# Patient Record
Sex: MALE | Race: WHITE | NOT HISPANIC OR LATINO | Employment: FULL TIME | URBAN - METROPOLITAN AREA
[De-identification: names, ages, dates, MRNs, and addresses within clinical notes are randomized per-mention and may not be internally consistent; named-entity substitution may affect disease eponyms.]

---

## 2021-02-04 ENCOUNTER — HISTORICAL (OUTPATIENT)
Dept: LAB | Facility: HOSPITAL | Age: 61
End: 2021-02-04

## 2021-02-04 LAB
ABS NEUT (OLG): 4.49 X10(3)/MCL (ref 2.1–9.2)
ALBUMIN SERPL-MCNC: 4.2 GM/DL (ref 3.4–4.8)
ALBUMIN/GLOB SERPL: 1.4 RATIO (ref 1.1–2)
ALP SERPL-CCNC: 72 UNIT/L (ref 40–150)
ALT SERPL-CCNC: 28 UNIT/L (ref 0–55)
APPEARANCE, UA: CLEAR
AST SERPL-CCNC: 21 UNIT/L (ref 5–34)
BACTERIA #/AREA URNS AUTO: ABNORMAL /HPF
BASOPHILS # BLD AUTO: 0.1 X10(3)/MCL (ref 0–0.2)
BASOPHILS NFR BLD AUTO: 1 %
BILIRUB SERPL-MCNC: 0.6 MG/DL
BILIRUB UR QL STRIP: NEGATIVE
BILIRUBIN DIRECT+TOT PNL SERPL-MCNC: 0.2 MG/DL (ref 0–0.5)
BILIRUBIN DIRECT+TOT PNL SERPL-MCNC: 0.4 MG/DL (ref 0–0.8)
BUN SERPL-MCNC: 13 MG/DL (ref 8.4–25.7)
CALCIUM SERPL-MCNC: 9 MG/DL (ref 8.8–10)
CHLORIDE SERPL-SCNC: 108 MMOL/L (ref 98–107)
CHOLEST SERPL-MCNC: 141 MG/DL
CHOLEST/HDLC SERPL: 3 {RATIO} (ref 0–5)
CO2 SERPL-SCNC: 24 MMOL/L (ref 23–31)
COLOR UR: YELLOW
CREAT SERPL-MCNC: 0.84 MG/DL (ref 0.73–1.18)
DEPRECATED CALCIDIOL+CALCIFEROL SERPL-MC: 37 NG/ML (ref 30–80)
EOSINOPHIL # BLD AUTO: 0.4 X10(3)/MCL (ref 0–0.9)
EOSINOPHIL NFR BLD AUTO: 4 %
ERYTHROCYTE [DISTWIDTH] IN BLOOD BY AUTOMATED COUNT: 13.2 % (ref 11.5–14.5)
EST. AVERAGE GLUCOSE BLD GHB EST-MCNC: 105.4 MG/DL
GLOBULIN SER-MCNC: 3.1 GM/DL (ref 2.4–3.5)
GLUCOSE (UA): NEGATIVE
GLUCOSE SERPL-MCNC: 96 MG/DL (ref 82–115)
HBA1C MFR BLD: 5.3 %
HCT VFR BLD AUTO: 42.1 % (ref 40–51)
HDLC SERPL-MCNC: 46 MG/DL (ref 35–60)
HGB BLD-MCNC: 13.8 GM/DL (ref 13.5–17.5)
HGB UR QL STRIP: NEGATIVE
HYALINE CASTS #/AREA URNS LPF: ABNORMAL /LPF
IMM GRANULOCYTES # BLD AUTO: 0.02 10*3/UL
IMM GRANULOCYTES NFR BLD AUTO: 0 %
KETONES UR QL STRIP: NEGATIVE
LDLC SERPL CALC-MCNC: 63 MG/DL (ref 50–140)
LEUKOCYTE ESTERASE UR QL STRIP: NEGATIVE
LYMPHOCYTES # BLD AUTO: 2.5 X10(3)/MCL (ref 0.6–4.6)
LYMPHOCYTES NFR BLD AUTO: 32 %
MCH RBC QN AUTO: 28.9 PG (ref 26–34)
MCHC RBC AUTO-ENTMCNC: 32.8 GM/DL (ref 31–37)
MCV RBC AUTO: 88.3 FL (ref 80–100)
MONOCYTES # BLD AUTO: 0.5 X10(3)/MCL (ref 0.1–1.3)
MONOCYTES NFR BLD AUTO: 6 %
NEUTROPHILS # BLD AUTO: 4.49 X10(3)/MCL (ref 2.1–9.2)
NEUTROPHILS NFR BLD AUTO: 56 %
NITRITE UR QL STRIP: NEGATIVE
PH UR STRIP: 6 [PH] (ref 4.5–8)
PLATELET # BLD AUTO: 263 X10(3)/MCL (ref 130–400)
PMV BLD AUTO: 11.3 FL (ref 7.4–10.4)
POTASSIUM SERPL-SCNC: 4.3 MMOL/L (ref 3.5–5.1)
PROT SERPL-MCNC: 7.3 GM/DL (ref 5.8–7.6)
PROT UR QL STRIP: NEGATIVE
PSA SERPL-MCNC: 5.38 NG/ML
RBC # BLD AUTO: 4.77 X10(6)/MCL (ref 4.5–5.9)
RBC #/AREA URNS AUTO: ABNORMAL /HPF
SODIUM SERPL-SCNC: 141 MMOL/L (ref 136–145)
SP GR UR STRIP: 1.02 (ref 1–1.03)
SQUAMOUS #/AREA URNS LPF: ABNORMAL /LPF
TRIGL SERPL-MCNC: 162 MG/DL (ref 34–140)
UROBILINOGEN UR STRIP-ACNC: NORMAL
VIT B12 SERPL-MCNC: 519 PG/ML (ref 213–816)
VLDLC SERPL CALC-MCNC: 32 MG/DL
WBC # SPEC AUTO: 8 X10(3)/MCL (ref 4.5–11)
WBC #/AREA URNS AUTO: ABNORMAL /HPF

## 2021-02-09 ENCOUNTER — HISTORICAL (OUTPATIENT)
Dept: CARDIOLOGY | Facility: HOSPITAL | Age: 61
End: 2021-02-09

## 2021-02-09 LAB — PSA SERPL-MCNC: 4.59 NG/ML

## 2021-02-22 LAB — CRC RECOMMENDATION EXT: NORMAL

## 2021-03-24 ENCOUNTER — HISTORICAL (OUTPATIENT)
Dept: RADIOLOGY | Facility: HOSPITAL | Age: 61
End: 2021-03-24

## 2022-02-03 ENCOUNTER — HISTORICAL (OUTPATIENT)
Dept: FAMILY MEDICINE | Facility: CLINIC | Age: 62
End: 2022-02-03

## 2022-02-03 LAB
ALBUMIN SERPL-MCNC: 4 G/DL (ref 3.4–4.8)
ALBUMIN/GLOB SERPL: 1.1 {RATIO} (ref 1.1–2)
ALP SERPL-CCNC: 67 U/L (ref 40–150)
ALT SERPL-CCNC: 23 U/L (ref 0–55)
AST SERPL-CCNC: 18 U/L (ref 5–34)
BILIRUB SERPL-MCNC: 0.6 MG/DL
BILIRUBIN DIRECT+TOT PNL SERPL-MCNC: 0.2 (ref 0–0.5)
BILIRUBIN DIRECT+TOT PNL SERPL-MCNC: 0.4 (ref 0–0.8)
BUN SERPL-MCNC: 18 MG/DL (ref 8.4–25.7)
CALCIUM SERPL-MCNC: 9.6 MG/DL (ref 8.7–10.5)
CHLORIDE SERPL-SCNC: 110 MMOL/L (ref 98–107)
CHOLEST SERPL-MCNC: 142 MG/DL
CHOLEST/HDLC SERPL: 3 {RATIO} (ref 0–5)
CO2 SERPL-SCNC: 23 MMOL/L (ref 23–31)
CREAT SERPL-MCNC: 0.8 MG/DL (ref 0.73–1.18)
DEPRECATED CALCIDIOL+CALCIFEROL SERPL-MC: 39.1 NG/ML (ref 30–80)
EST. AVERAGE GLUCOSE BLD GHB EST-MCNC: 102.5 MG/DL
GLOBULIN SER-MCNC: 3.7 G/DL (ref 2.4–3.5)
GLUCOSE SERPL-MCNC: 108 MG/DL (ref 82–115)
HBA1C MFR BLD: 5.2 %
HDLC SERPL-MCNC: 49 MG/DL (ref 35–60)
ICTERIC INTERF INDEX SERPL-ACNC: 1
LDLC SERPL CALC-MCNC: 65 MG/DL (ref 50–140)
LIPEMIC INTERF INDEX SERPL-ACNC: 5
POTASSIUM SERPL-SCNC: 4 MMOL/L (ref 3.5–5.1)
PROT SERPL-MCNC: 7.7 G/DL (ref 5.8–7.6)
PSA SERPL-MCNC: 5.82 NG/ML
SODIUM SERPL-SCNC: 141 MMOL/L (ref 136–145)
TRIGL SERPL-MCNC: 138 MG/DL (ref 34–140)
TSH SERPL-ACNC: 1.1 M[IU]/L (ref 0.35–4.94)
URATE UR-MCNC: 44.1 MG/DL
VLDLC SERPL CALC-MCNC: 28 MG/DL

## 2022-04-10 ENCOUNTER — HISTORICAL (OUTPATIENT)
Dept: ADMINISTRATIVE | Facility: HOSPITAL | Age: 62
End: 2022-04-10
Payer: MEDICAID

## 2022-04-28 VITALS
SYSTOLIC BLOOD PRESSURE: 144 MMHG | HEIGHT: 71 IN | DIASTOLIC BLOOD PRESSURE: 90 MMHG | OXYGEN SATURATION: 96 % | WEIGHT: 232.13 LBS | BODY MASS INDEX: 32.5 KG/M2

## 2022-05-05 ENCOUNTER — OFFICE VISIT (OUTPATIENT)
Dept: UROLOGY | Facility: CLINIC | Age: 62
End: 2022-05-05
Payer: MEDICAID

## 2022-05-05 VITALS
SYSTOLIC BLOOD PRESSURE: 134 MMHG | RESPIRATION RATE: 16 BRPM | TEMPERATURE: 98 F | HEIGHT: 71 IN | OXYGEN SATURATION: 98 % | DIASTOLIC BLOOD PRESSURE: 86 MMHG | HEART RATE: 68 BPM | WEIGHT: 232.13 LBS | BODY MASS INDEX: 32.5 KG/M2

## 2022-05-05 DIAGNOSIS — R97.20 ELEVATED PSA: Primary | ICD-10-CM

## 2022-05-05 PROCEDURE — 4010F PR ACE/ARB THEARPY RXD/TAKEN: ICD-10-PCS | Mod: CPTII,,, | Performed by: NURSE PRACTITIONER

## 2022-05-05 PROCEDURE — 3079F DIAST BP 80-89 MM HG: CPT | Mod: CPTII,,, | Performed by: NURSE PRACTITIONER

## 2022-05-05 PROCEDURE — 3008F PR BODY MASS INDEX (BMI) DOCUMENTED: ICD-10-PCS | Mod: CPTII,,, | Performed by: NURSE PRACTITIONER

## 2022-05-05 PROCEDURE — 99214 OFFICE O/P EST MOD 30 MIN: CPT | Mod: S$PBB,,, | Performed by: NURSE PRACTITIONER

## 2022-05-05 PROCEDURE — 3075F SYST BP GE 130 - 139MM HG: CPT | Mod: CPTII,,, | Performed by: NURSE PRACTITIONER

## 2022-05-05 PROCEDURE — 4010F ACE/ARB THERAPY RXD/TAKEN: CPT | Mod: CPTII,,, | Performed by: NURSE PRACTITIONER

## 2022-05-05 PROCEDURE — 3008F BODY MASS INDEX DOCD: CPT | Mod: CPTII,,, | Performed by: NURSE PRACTITIONER

## 2022-05-05 PROCEDURE — 1160F PR REVIEW ALL MEDS BY PRESCRIBER/CLIN PHARMACIST DOCUMENTED: ICD-10-PCS | Mod: CPTII,,, | Performed by: NURSE PRACTITIONER

## 2022-05-05 PROCEDURE — 1160F RVW MEDS BY RX/DR IN RCRD: CPT | Mod: CPTII,,, | Performed by: NURSE PRACTITIONER

## 2022-05-05 PROCEDURE — 99214 PR OFFICE/OUTPT VISIT, EST, LEVL IV, 30-39 MIN: ICD-10-PCS | Mod: S$PBB,,, | Performed by: NURSE PRACTITIONER

## 2022-05-05 PROCEDURE — 3075F PR MOST RECENT SYSTOLIC BLOOD PRESS GE 130-139MM HG: ICD-10-PCS | Mod: CPTII,,, | Performed by: NURSE PRACTITIONER

## 2022-05-05 PROCEDURE — 1159F MED LIST DOCD IN RCRD: CPT | Mod: CPTII,,, | Performed by: NURSE PRACTITIONER

## 2022-05-05 PROCEDURE — 3079F PR MOST RECENT DIASTOLIC BLOOD PRESSURE 80-89 MM HG: ICD-10-PCS | Mod: CPTII,,, | Performed by: NURSE PRACTITIONER

## 2022-05-05 PROCEDURE — 99214 OFFICE O/P EST MOD 30 MIN: CPT | Mod: PBBFAC | Performed by: NURSE PRACTITIONER

## 2022-05-05 PROCEDURE — 1159F PR MEDICATION LIST DOCUMENTED IN MEDICAL RECORD: ICD-10-PCS | Mod: CPTII,,, | Performed by: NURSE PRACTITIONER

## 2022-05-05 RX ORDER — LISINOPRIL 40 MG/1
40 TABLET ORAL DAILY
COMMUNITY
Start: 2022-01-26 | End: 2022-05-16

## 2022-05-05 RX ORDER — TRAMADOL HYDROCHLORIDE AND ACETAMINOPHEN 37.5; 325 MG/1; MG/1
1 TABLET, FILM COATED ORAL EVERY 4 HOURS
COMMUNITY
End: 2022-05-16 | Stop reason: ALTCHOICE

## 2022-05-05 RX ORDER — COLCHICINE 0.6 MG/1
0.6 TABLET ORAL DAILY
COMMUNITY
End: 2022-05-16 | Stop reason: ALTCHOICE

## 2022-05-05 RX ORDER — ALLOPURINOL 300 MG/1
300 TABLET ORAL DAILY
COMMUNITY
Start: 2022-04-24 | End: 2022-12-08 | Stop reason: SDUPTHER

## 2022-05-05 RX ORDER — HYDROCHLOROTHIAZIDE 12.5 MG/1
12.5 CAPSULE ORAL DAILY
COMMUNITY
Start: 2022-02-22 | End: 2022-05-16

## 2022-05-05 RX ORDER — KETOCONAZOLE 200 MG/1
200 TABLET ORAL
COMMUNITY
End: 2022-05-16 | Stop reason: SDUPTHER

## 2022-05-05 RX ORDER — ATORVASTATIN CALCIUM 20 MG/1
2 TABLET, FILM COATED ORAL DAILY
COMMUNITY
Start: 2022-04-14 | End: 2022-05-16

## 2022-05-05 NOTE — PROGRESS NOTES
Pt referred here today for elevated PSA.  Evaluated per VIKTOR Randall NP.  Pt referred to Gastro for prostate bx and will RTC when completed.

## 2022-05-05 NOTE — PROGRESS NOTES
Chief Complaint:  Elevated PSA  Chief Complaint   Patient presents with    Elevated PSA     Not experiecing any problems       HPI:  Patient referred to Urology for elevated PSA.  Patient seen by Dr. Ramon Medrano for colonoscopy and a routine PSA 4.59, PSA repeated in 1 year and escalated 5.82.  Patient complains of mild decreased urine stream but denies any urinary frequency, urgency, nocturia, dysuria, urinary retention.  KARMEN:  1-1/2 fingerbreadths wide, soft, round, smooth prostate palpated.  Plan: send for prostate biopsy return for results after.  Allergies:  Review of patient's allergies indicates:  No Known Allergies    Medications:  Current Outpatient Medications   Medication Sig Dispense Refill    allopurinoL (ZYLOPRIM) 300 MG tablet Take 300 mg by mouth once daily.      atorvastatin (LIPITOR) 20 MG tablet Take 2 tablets by mouth once daily at 6am.      colchicine (COLCRYS) 0.6 mg tablet Take 0.6 mg by mouth once daily.      hydroCHLOROthiazide (MICROZIDE) 12.5 mg capsule Take 12.5 mg by mouth once daily.      ketoconazole (NIZORAL) 200 mg Tab Take 200 mg by mouth.      lisinopriL (PRINIVIL,ZESTRIL) 40 MG tablet Take 40 mg by mouth once daily.      tramadol-acetaminophen 37.5-325 mg (ULTRACET) 37.5-325 mg Tab Take 1 tablet by mouth every 4 (four) hours.       No current facility-administered medications for this visit.       Review of Systems:  General: No fever, chills, fatigability, or weight loss.  Skin: No rashes, itching, or changes in color or texture of skin.  Chest: Denies GARCIA, cyanosis, wheezing, cough, and sputum production.  Abdomen: Appetite fine. No weight loss. Denies diarrhea, abdominal pain, hematemesis, or blood in stool.  Musculoskeletal: No joint stiffness or swelling. Denies back pain.  : As above.  All other review of systems negative.    PMH:  Past Medical History:   Diagnosis Date    Gout, unspecified     Hypertension     Tinea versicolor        PSH:  Past Surgical  History:   Procedure Laterality Date    APPENDECTOMY      CIRCUMCISION      ENDOSCOPY      INNER EAR SURGERY      KNEE CARTILAGE SURGERY      TONSILLECTOMY         FamHx:  Family History   Problem Relation Age of Onset    Lung cancer Mother     Liver cancer Father     Drug abuse Sister     Lung cancer Brother        SocHx:  Social History     Socioeconomic History    Marital status:    Tobacco Use    Smoking status: Never Smoker    Smokeless tobacco: Never Used   Substance and Sexual Activity    Alcohol use: Yes     Comment: 1-2 times weekly    Drug use: Not Currently     Types: Marijuana       Physical Exam:  Vitals:    05/05/22 1051   BP: 134/86   Pulse: 68   Resp: 16   Temp: 97.9 °F (36.6 °C)     General: A&Ox3, no apparent distress, no deformities  Neck: No masses, normal thyroid  Lungs: CTA michelet, no use of accessory muscles  Heart: RRR, no arrhythmias  Abdomen: Soft, NT, ND, no masses, no hernias, no hepatosplenomegaly  Lymphatic: Neck and groin nodes negative  Skin: The skin is warm and dry. No jaundice.  Ext: No c/c/e.    GUMale: Test desc michelet, no abnormalities of epididymus. Penis, circumcised with normal penile and scrotal skin. Meatus normal. Normal rectal tone, no hemorrhoids. Prostate as above stated, no nodules or masses appreciated. SV not palpable. Perineum and anus normal.      Labs:  PSA 5.82      [unfilled]  [unfilled]  [unfilled]  [unfilled]  [unfilled]    Imaging: none      Impression: Elevated PSA       Plan:send for prostate biopsy return for results after.

## 2022-05-16 ENCOUNTER — OFFICE VISIT (OUTPATIENT)
Dept: FAMILY MEDICINE | Facility: CLINIC | Age: 62
End: 2022-05-16
Payer: MEDICAID

## 2022-05-16 VITALS
SYSTOLIC BLOOD PRESSURE: 133 MMHG | DIASTOLIC BLOOD PRESSURE: 76 MMHG | RESPIRATION RATE: 18 BRPM | HEART RATE: 68 BPM | OXYGEN SATURATION: 97 % | BODY MASS INDEX: 32.44 KG/M2 | WEIGHT: 232.56 LBS

## 2022-05-16 DIAGNOSIS — I10 PRIMARY HYPERTENSION: Primary | ICD-10-CM

## 2022-05-16 DIAGNOSIS — E78.5 HYPERLIPIDEMIA, UNSPECIFIED HYPERLIPIDEMIA TYPE: ICD-10-CM

## 2022-05-16 DIAGNOSIS — B36.0 TINEA VERSICOLOR: ICD-10-CM

## 2022-05-16 PROCEDURE — 99213 OFFICE O/P EST LOW 20 MIN: CPT | Mod: PBBFAC

## 2022-05-16 RX ORDER — KETOCONAZOLE 200 MG/1
200 TABLET ORAL DAILY
Qty: 5 TABLET | Refills: 2 | Status: SHIPPED | OUTPATIENT
Start: 2022-05-16 | End: 2022-05-31

## 2022-05-16 RX ORDER — ATORVASTATIN CALCIUM 40 MG/1
40 TABLET, FILM COATED ORAL DAILY
Qty: 90 TABLET | Refills: 1 | Status: SHIPPED | OUTPATIENT
Start: 2022-05-16 | End: 2022-07-13 | Stop reason: SDUPTHER

## 2022-05-16 RX ORDER — LISINOPRIL 40 MG/1
40 TABLET ORAL DAILY
Qty: 90 TABLET | Refills: 1 | Status: SHIPPED | OUTPATIENT
Start: 2022-05-16 | End: 2022-11-08 | Stop reason: SDUPTHER

## 2022-05-16 RX ORDER — HYDROCHLOROTHIAZIDE 12.5 MG/1
12.5 CAPSULE ORAL DAILY
Qty: 90 CAPSULE | Refills: 1 | Status: SHIPPED | OUTPATIENT
Start: 2022-05-16 | End: 2022-11-08 | Stop reason: SDUPTHER

## 2022-05-16 NOTE — PROGRESS NOTES
[facility]  Office Visit Note    Subjective:       Patient ID: Eduin Ortiz is a 62 y.o. male.    Chief Complaint: Follow-up and Medication Refill (Change rx)      62 y.o. male presents for follow-up for HTN    Acute concerns:  HTN: Pt has been compliant with med changes at last visit. Currently taking lisinopril 40 qd, HCTZ 12.5 qd. Pt has not been checking BP frequently at home but reports that that it is usually around 133/70 and never goes above 140 SBP. Denies H/A, dizziness, weakness, edema, CP, SOB. Needs refills today.    HLD: Compliant with Lipitor 40 qd. No problems with meds.    Tinea versicolor: Pt reports seasonal erythematous, pruritic rash on his chest and back that has started again with the heat. In the past he has been diagnosed with tinea versicolor which resolves with oral ketoconazole. The rash gets worse with heat and better in the shower.    Pt will have prostate bx with urology on 5/31.     ROS  See HPI      Objective:      Vitals:    05/16/22 0859   BP: 133/76   BP Location: Right arm   Patient Position: Sitting   Pulse: 68   Resp: 18   SpO2: 97%   Weight: 105.5 kg (232 lb 9.4 oz)     Physical Exam  Gen: NAD, well-appearing, sitting comfortably on table  CV: RRR, no murmurs, rubs, or gallops. 2+ radial, DP pulses  Resp: equal sounds b/l, no wheezes, crackles  Skin: Diffuse erythema with mild lichenification of upper chest, diffuse pink macular rash with scaling on back  MSK: no edema.  Neuro: No changes to vision, no numbness, no tingling.      Lab Results   Component Value Date    HGBA1C 5.2 02/03/2022         Current Outpatient Medications:     allopurinoL (ZYLOPRIM) 300 MG tablet, Take 300 mg by mouth once daily., Disp: , Rfl:     atorvastatin (LIPITOR) 40 MG tablet, Take 1 tablet (40 mg total) by mouth once daily., Disp: 90 tablet, Rfl: 1    hydroCHLOROthiazide (MICROZIDE) 12.5 mg capsule, Take 1 capsule (12.5 mg total) by mouth once daily., Disp: 90 capsule, Rfl: 1    ketoconazole  (NIZORAL) 200 mg Tab, Take 1 tablet (200 mg total) by mouth once daily. for 15 days, Disp: 5 tablet, Rfl: 2    lisinopriL (PRINIVIL,ZESTRIL) 40 MG tablet, Take 1 tablet (40 mg total) by mouth once daily., Disp: 90 tablet, Rfl: 1        Assessment/Plan:       1. Primary hypertension    2. Tinea versicolor    3. Hyperlipidemia, unspecified hyperlipidemia type      1. HTN  - Will begin combined lisinopril-HCTZ  - CMP due in 8/2022    2. HLD  - 10-year ASCVD risk of 11%  - Continue Lipitor 40mg qd    3. Tinea versicolor  - Failed topical treatment in past  - Ketoconazole 200mg PO x5 days, PRN with 2 refills     RTC 3 months for f/u HTN with BMP

## 2022-05-31 ENCOUNTER — ANESTHESIA (OUTPATIENT)
Dept: ENDOSCOPY | Facility: HOSPITAL | Age: 62
End: 2022-05-31
Payer: MEDICAID

## 2022-05-31 ENCOUNTER — HOSPITAL ENCOUNTER (OUTPATIENT)
Facility: HOSPITAL | Age: 62
Discharge: HOME | End: 2022-05-31
Attending: UROLOGY | Admitting: UROLOGY
Payer: MEDICAID

## 2022-05-31 ENCOUNTER — ANESTHESIA EVENT (OUTPATIENT)
Dept: ENDOSCOPY | Facility: HOSPITAL | Age: 62
End: 2022-05-31
Payer: MEDICAID

## 2022-05-31 DIAGNOSIS — R97.20 ELEVATED PSA: Primary | ICD-10-CM

## 2022-05-31 PROCEDURE — 00902 ANES ANORECTAL PX: CPT | Performed by: UROLOGY

## 2022-05-31 PROCEDURE — 25000003 PHARM REV CODE 250: Performed by: NURSE ANESTHETIST, CERTIFIED REGISTERED

## 2022-05-31 PROCEDURE — 25000003 PHARM REV CODE 250: Performed by: UROLOGY

## 2022-05-31 PROCEDURE — 37000008 HC ANESTHESIA 1ST 15 MINUTES: Performed by: UROLOGY

## 2022-05-31 PROCEDURE — 55700 HC BIOPSY OF PROSTATE - NEEDLE OR PUNCH: CPT

## 2022-05-31 PROCEDURE — 27201423 OPTIME MED/SURG SUP & DEVICES STERILE SUPPLY: Performed by: UROLOGY

## 2022-05-31 PROCEDURE — 63600175 PHARM REV CODE 636 W HCPCS: Performed by: NURSE ANESTHETIST, CERTIFIED REGISTERED

## 2022-05-31 PROCEDURE — 37000009 HC ANESTHESIA EA ADD 15 MINS: Performed by: UROLOGY

## 2022-05-31 PROCEDURE — 63600175 PHARM REV CODE 636 W HCPCS: Performed by: UROLOGY

## 2022-05-31 RX ORDER — CIPROFLOXACIN 500 MG/1
500 TABLET ORAL
Status: DISCONTINUED | OUTPATIENT
Start: 2022-05-31 | End: 2022-05-31 | Stop reason: HOSPADM

## 2022-05-31 RX ORDER — SODIUM CHLORIDE, SODIUM LACTATE, POTASSIUM CHLORIDE, CALCIUM CHLORIDE 600; 310; 30; 20 MG/100ML; MG/100ML; MG/100ML; MG/100ML
INJECTION, SOLUTION INTRAVENOUS CONTINUOUS
Status: DISCONTINUED | OUTPATIENT
Start: 2022-05-31 | End: 2022-05-31 | Stop reason: HOSPADM

## 2022-05-31 RX ORDER — CEFTRIAXONE 1 G/1
1 INJECTION, POWDER, FOR SOLUTION INTRAMUSCULAR; INTRAVENOUS
Status: SHIPPED | OUTPATIENT
Start: 2022-05-31 | End: 2022-05-31

## 2022-05-31 RX ORDER — CIPROFLOXACIN 500 MG/1
500 TABLET ORAL
Status: SHIPPED | OUTPATIENT
Start: 2022-05-31 | End: 2022-05-31

## 2022-05-31 RX ORDER — PROPOFOL 10 MG/ML
VIAL (ML) INTRAVENOUS
Status: DISCONTINUED | OUTPATIENT
Start: 2022-05-31 | End: 2022-05-31

## 2022-05-31 RX ORDER — CIPROFLOXACIN 500 MG/1
500 TABLET ORAL EVERY 12 HOURS
Qty: 6 TABLET | Refills: 0 | Status: SHIPPED | OUTPATIENT
Start: 2022-05-31 | End: 2022-06-03

## 2022-05-31 RX ORDER — CEFTRIAXONE 1 G/1
1 INJECTION, POWDER, FOR SOLUTION INTRAMUSCULAR; INTRAVENOUS
Status: DISCONTINUED | OUTPATIENT
Start: 2022-05-31 | End: 2022-05-31 | Stop reason: HOSPADM

## 2022-05-31 RX ORDER — LIDOCAINE HYDROCHLORIDE 20 MG/ML
INJECTION, SOLUTION EPIDURAL; INFILTRATION; INTRACAUDAL; PERINEURAL
Status: DISCONTINUED | OUTPATIENT
Start: 2022-05-31 | End: 2022-05-31

## 2022-05-31 RX ADMIN — CIPROFLOXACIN HYDROCHLORIDE 500 MG: 500 TABLET, FILM COATED ORAL at 09:05

## 2022-05-31 RX ADMIN — PROPOFOL 40 MG: 10 INJECTION, EMULSION INTRAVENOUS at 10:05

## 2022-05-31 RX ADMIN — LIDOCAINE HYDROCHLORIDE 60 MG: 20 INJECTION, SOLUTION EPIDURAL; INFILTRATION; INTRACAUDAL; PERINEURAL at 10:05

## 2022-05-31 RX ADMIN — PROPOFOL 100 MG: 10 INJECTION, EMULSION INTRAVENOUS at 10:05

## 2022-05-31 RX ADMIN — SODIUM CHLORIDE, POTASSIUM CHLORIDE, SODIUM LACTATE AND CALCIUM CHLORIDE: 600; 310; 30; 20 INJECTION, SOLUTION INTRAVENOUS at 08:05

## 2022-05-31 RX ADMIN — CEFTRIAXONE 1 G: 1 INJECTION, POWDER, FOR SOLUTION INTRAMUSCULAR; INTRAVENOUS at 09:05

## 2022-05-31 NOTE — ANESTHESIA POSTPROCEDURE EVALUATION
Anesthesia Post Evaluation    Patient: Eduin Ortiz    Procedure(s) Performed: Procedure(s) (LRB):  BIOPSY, PROSTATE, RECTAL APPROACH, WITH US GUIDANCE (N/A)    Final Anesthesia Type: general      Patient location during evaluation: ICU  Patient participation: Yes- Able to Participate  Level of consciousness: awake, oriented and sedated  Post-procedure vital signs: reviewed and stable  Pain management: adequate  Airway patency: patent    PONV status at discharge: No PONV  Anesthetic complications: no      Cardiovascular status: hemodynamically stable  Respiratory status: unassisted, room air and spontaneous ventilation  Hydration status: euvolemic  Follow-up not needed.          Vitals Value Taken Time   /97 05/31/22 0804   Temp 36.7 °C (98.1 °F) 05/31/22 0804   Pulse 77 05/31/22 0804   Resp 20 05/31/22 0804   SpO2 99 % 05/31/22 0804         No case tracking events are documented in the log.      Pain/Ja Score: No data recorded

## 2022-05-31 NOTE — OP NOTE
[facility] -  Operative Note      Date of Procedure: 5/31/22    [facility]     Procedure: Procedure(s) (LRB):  BIOPSY, PROSTATE, RECTAL APPROACH, WITH US GUIDANCE (N/A)     Surgeon(s) and Role:     * Colton Herrera MD - Primary    Assisting Surgeon: None    Pre-Operative Diagnosis: Elevated PSA [R97.20]    Post-Operative Diagnosis: Post-Op Diagnosis Codes:     * Elevated PSA [R97.20]    Anesthesia: General    Operative Findings (including complications, if any):77 cc gland.  No suspicious lesions    Description of Technical Procedures:  Mr  Use   year-old male with elevated PSA of 5.8    He comes in for prostate biopsy today risk and benefits discussed including bleeding infection  failure to diagnose.    He was taken to the operative suite underwent satisfactory general anesthesia.  He was placed with the right flank up hips flexed up.  Eight megahertz ultrasound probe was inserted into his rectal vault.  His prostate measured  77cc  mid gland. There were no suspicious hyper or hypoechoic areas seen.  He underwent sextant biopsies in standard fashion.  Two in each base 2 in each mid 2 in each apex.  He tolerated the procedure well with minimal bleeding    He was taken back to recovery room stable condition.  He will be seen in the central clinic Urology with Dr Stewart June 9th. .  He is will be given a prescription for Cipro for the next 3 days.  He was instructed should he have temperature over 101 he is to report back to the emergency room for IV antibiotic therapy.  He was given a postbiopsy instruction sheet for items to call should he have problems.          Estimated Blood Loss (EBL): Minimal  Specimens:   Specimen (24h ago, onward)                 Start     Ordered    05/31/22 0949  Specimen to Pathology  RELEASE UPON ORDERING        References:    Click here for ordering Quick Tip   Question:  Release to patient  Answer:  Immediate    05/31/22 0949                            Condition:  Good    Disposition: PACU - hemodynamically stable.      Discharge Note    OUTCOME: Condition has improved and patient is now ready for discharge.    DISPOSITION: Home or Self Care        FOLLOWUP: In clinic    DISCHARGE INSTRUCTIONS:    Discharge Procedure Orders   Diet general     Call MD for:  temperature >100.4     Activity as tolerated        Clinical Reference Documents Added to Patient Instructions         Document    PROSTATE BIOPSY DISCHARGE INSTRUCTIONS (ENGLISH)

## 2022-05-31 NOTE — ANESTHESIA PREPROCEDURE EVALUATION
05/31/2022  Eduin Ortiz is a 62 y.o., male with HTN, Hyperlipidemia for Prostate Biopys secondary to elevated PSA    Active Ambulatory Problems     Diagnosis Date Noted    Elevated PSA 05/05/2022    Hypertension 05/16/2022    HLD (hyperlipidemia) 05/16/2022    Tinea versicolor 05/16/2022     Resolved Ambulatory Problems     Diagnosis Date Noted    No Resolved Ambulatory Problems     Past Medical History:   Diagnosis Date    Gout, unspecified     Obesity, Class I, BMI 30-34.9      .  Past Surgical History:   Procedure Laterality Date    APPENDECTOMY      CIRCUMCISION      ENDOSCOPY      INNER EAR SURGERY      KNEE CARTILAGE SURGERY      TONSILLECTOMY       There were no vitals filed for this visit.        Pre-op Assessment    I have reviewed the Patient Summary Reports.     I have reviewed the Nursing Notes. I have reviewed the NPO Status.   I have reviewed the Medications.     Review of Systems  Anesthesia Hx:  No previous Anesthesia  Neg history of prior surgery. Denies Family Hx of Anesthesia complications.   Denies Personal Hx of Anesthesia complications.   Social:  Non-Smoker    Hematology/Oncology:  Hematology Normal   Oncology Normal     EENT/Dental:EENT/Dental Normal   Cardiovascular:   Exercise tolerance: good Hypertension  Hypertension    Pulmonary:  Pulmonary Normal    Renal/:  Renal/ Normal     Hepatic/GI:  Hepatic/GI Normal    Neurological:  Neurology Normal    Endocrine:  Endocrine Normal    Dermatological:  Skin Normal    Psych:  Psychiatric Normal           Physical Exam  General: Cooperative, Well nourished, Alert and Oriented    Airway:  Mallampati: III / III  Mouth Opening: Small, but > 3cm  TM Distance: Normal  Tongue: Normal  Neck ROM: Normal ROM    Dental:  Periodontal disease, Caps / Implants, Edentulous        Anesthesia Plan  Type of Anesthesia, risks & benefits  discussed:    Anesthesia Type: MAC  Intra-op Monitoring Plan: Standard ASA Monitors  Post Op Pain Control Plan: IV/PO Opioids PRN  (medical reason for not using multimodal pain management)  Induction:  IV  Informed Consent: Informed consent signed with the Patient and all parties understand the risks and agree with anesthesia plan.  All questions answered. Patient consented to blood products? No  ASA Score: 3  Day of Surgery Review of History & Physical: H&P Update referred to the surgeon/provider.I have interviewed and examined the patient. I have reviewed the patient's H&P dated: There are no significant changes. H&P completed by Anesthesiologist.    Ready For Surgery From Anesthesia Perspective.     .

## 2022-06-01 VITALS
OXYGEN SATURATION: 99 % | SYSTOLIC BLOOD PRESSURE: 152 MMHG | RESPIRATION RATE: 20 BRPM | BODY MASS INDEX: 28.14 KG/M2 | HEART RATE: 78 BPM | TEMPERATURE: 98 F | WEIGHT: 201 LBS | HEIGHT: 71 IN | DIASTOLIC BLOOD PRESSURE: 97 MMHG

## 2022-06-07 LAB
DHEA SERPL-MCNC: NORMAL
ESTROGEN SERPL-MCNC: NORMAL PG/ML
INSULIN SERPL-ACNC: NORMAL U[IU]/ML
LAB AP CLINICAL INFORMATION: NORMAL
LAB AP GROSS DESCRIPTION: NORMAL
LAB AP REPORT FOOTNOTES: NORMAL
T3RU NFR SERPL: NORMAL %

## 2022-06-13 ENCOUNTER — OFFICE VISIT (OUTPATIENT)
Dept: UROLOGY | Facility: CLINIC | Age: 62
End: 2022-06-13
Payer: MEDICAID

## 2022-06-13 VITALS
HEART RATE: 85 BPM | TEMPERATURE: 98 F | OXYGEN SATURATION: 96 % | SYSTOLIC BLOOD PRESSURE: 138 MMHG | DIASTOLIC BLOOD PRESSURE: 81 MMHG | BODY MASS INDEX: 32.53 KG/M2 | RESPIRATION RATE: 20 BRPM | WEIGHT: 232.38 LBS | HEIGHT: 71 IN

## 2022-06-13 DIAGNOSIS — N52.01 ERECTILE DYSFUNCTION DUE TO ARTERIAL INSUFFICIENCY: ICD-10-CM

## 2022-06-13 DIAGNOSIS — C61 PROSTATE CANCER: Primary | ICD-10-CM

## 2022-06-13 PROBLEM — N52.9 ERECTILE DYSFUNCTION: Status: ACTIVE | Noted: 2022-06-13

## 2022-06-13 PROCEDURE — 99214 OFFICE O/P EST MOD 30 MIN: CPT | Mod: PBBFAC | Performed by: UROLOGY

## 2022-06-13 NOTE — PROGRESS NOTES
Chief Complaint:   Chief Complaint   Patient presents with    biopsy results     Still has blood with ejaculation       HPI: Mr. Ortiz is a 63yo M referred for elevated PSA.  PSA 5.82. Underwent uncomplicated prostate biopsy. Mild hematuria and hematospermia post-procedure. He admits to chronic ED with difficulty both obtaining and maintaining erections. No painful erections or abnormal curvature. No LUTS. Here for biopsy results.    Allergies:  Review of patient's allergies indicates:  No Known Allergies    Medications:  Current Outpatient Medications   Medication Sig Dispense Refill    allopurinoL (ZYLOPRIM) 300 MG tablet Take 300 mg by mouth once daily.      atorvastatin (LIPITOR) 40 MG tablet Take 1 tablet (40 mg total) by mouth once daily. 90 tablet 1    hydroCHLOROthiazide (MICROZIDE) 12.5 mg capsule Take 1 capsule (12.5 mg total) by mouth once daily. 90 capsule 1    lisinopriL (PRINIVIL,ZESTRIL) 40 MG tablet Take 1 tablet (40 mg total) by mouth once daily. 90 tablet 1     No current facility-administered medications for this visit.       Review of Systems:  General: No fever, chills, fatigability, or weight loss.  Skin: No rashes, itching, or changes in color or texture of skin.  Chest: Denies GARCIA, cyanosis, wheezing, cough, and sputum production.  Abdomen: Appetite fine. No weight loss. Denies diarrhea, abdominal pain, hematemesis, or blood in stool.  Musculoskeletal: No joint stiffness or swelling. Denies back pain.  : As above.  All other review of systems negative.    PMH:  Past Medical History:   Diagnosis Date    Gout, unspecified     HLD (hyperlipidemia)     Hypertension     Obesity, Class I, BMI 30-34.9     Tinea versicolor        PSH:  Past Surgical History:   Procedure Laterality Date    APPENDECTOMY      CIRCUMCISION      ENDOSCOPY      INNER EAR SURGERY      KNEE CARTILAGE SURGERY      TONSILLECTOMY      TRANSRECTAL BIOPSY OF PROSTATE WITH ULTRASOUND GUIDANCE N/A 5/31/2022     Procedure: BIOPSY, PROSTATE, RECTAL APPROACH, WITH US GUIDANCE;  Surgeon: Colton Herrera MD;  Location: [facility] ENDOSCOPY;  Service: Urology;  Laterality: N/A;       FamHx:  Family History   Problem Relation Age of Onset    Lung cancer Mother     Liver cancer Father     Drug abuse Sister     Lung cancer Brother        SocHx:  Social History     Socioeconomic History    Marital status:    Tobacco Use    Smoking status: Never Smoker    Smokeless tobacco: Never Used   Substance and Sexual Activity    Alcohol use: Yes     Comment: 1-2 times weekly    Drug use: Not Currently     Types: Marijuana       Physical Exam:  There were no vitals filed for this visit.  General: A&Ox3, no apparent distress, no deformities  Neck: No masses, normal thyroid  Lungs: CTA michelet, no use of accessory muscles  Heart: RRR, no arrhythmias  Abdomen: Soft, NT, ND, no masses, no hernias, no hepatosplenomegaly  Lymphatic: Neck and groin nodes negative  Skin: The skin is warm and dry. No jaundice.  Ext: No c/c/e.      Labs:      Prostate Specific Antigen   Date Value Ref Range Status   02/03/2022 5.82 <=4.00      1. Right prostate Needle Bx - base:   - Benign prostate tissue      2. Right prostate Needle Bx - mid:   - Benign prostate tissue.       3. Right prostate Needle Bx - apex:   - Prostate adenocarcinoma, Zac grade 3+ 3 equals score of 6 in 1 of 2 needle core biopsies, representing approximately 5% of the needle core tissue.     4. Left prostate Needle Bx - Base:   - Benign prostate tissue.       5. Left prostate Needle Bx - mid:   - Benign prostate tissue.     6. Left prostate Needle Bx - apex:   - Prostate adenocarcinoma, [place] grade 3+ 3 equals score of 6 in 1 of 2 needle core biopsies, representing approximately 10% of the needle core tissue.        Impression:  Problem List Items Addressed This Visit        Renal/    Erectile dysfunction       Oncology    Prostate cancer            Plan:  - Low risk prostate cancer  based on path and psa. Discussed active surveillance vs prostatectomy vs XRT. Risks of surgery discussed and he is not interested in this. Referral to Rad Onc . RTC 1 month with treatment decision.  - Discussed PDE5i for ED and he is not interested at this time.      Jaqueline Stewart MD  6/13/2022  Urology

## 2022-06-13 NOTE — PROGRESS NOTES
Patient seen by Dr. ISAEL Stewart will refer to radiation oncology for consultation and rtc in one month.

## 2022-06-21 ENCOUNTER — OFFICE VISIT (OUTPATIENT)
Dept: RADIATION THERAPY | Facility: HOSPITAL | Age: 62
End: 2022-06-21
Attending: RADIOLOGY
Payer: MEDICAID

## 2022-06-21 DIAGNOSIS — C61 PROSTATE CANCER: ICD-10-CM

## 2022-06-27 PROCEDURE — 77334 RADIATION TREATMENT AID(S): CPT | Performed by: RADIOLOGY

## 2022-06-28 PROCEDURE — 77301 RADIOTHERAPY DOSE PLAN IMRT: CPT | Performed by: RADIOLOGY

## 2022-06-28 PROCEDURE — 77300 RADIATION THERAPY DOSE PLAN: CPT | Performed by: RADIOLOGY

## 2022-06-28 PROCEDURE — 77338 DESIGN MLC DEVICE FOR IMRT: CPT | Performed by: RADIOLOGY

## 2022-06-29 PROCEDURE — 77386 HC IMRT, COMPLEX: CPT | Performed by: RADIOLOGY

## 2022-06-30 PROCEDURE — 77386 HC IMRT, COMPLEX: CPT | Performed by: RADIOLOGY

## 2022-07-01 ENCOUNTER — APPOINTMENT (OUTPATIENT)
Dept: RADIATION THERAPY | Facility: HOSPITAL | Age: 62
End: 2022-07-01
Attending: RADIOLOGY
Payer: MEDICAID

## 2022-07-01 PROCEDURE — 77386 HC IMRT, COMPLEX: CPT | Performed by: RADIOLOGY

## 2022-07-05 PROCEDURE — 77336 RADIATION PHYSICS CONSULT: CPT | Performed by: RADIOLOGY

## 2022-07-05 PROCEDURE — 77386 HC IMRT, COMPLEX: CPT | Performed by: RADIOLOGY

## 2022-07-06 PROCEDURE — 77386 HC IMRT, COMPLEX: CPT | Performed by: RADIOLOGY

## 2022-07-07 PROCEDURE — 77386 HC IMRT, COMPLEX: CPT | Performed by: RADIOLOGY

## 2022-07-08 PROCEDURE — 77386 HC IMRT, COMPLEX: CPT | Performed by: RADIOLOGY

## 2022-07-11 PROCEDURE — 77386 HC IMRT, COMPLEX: CPT | Performed by: RADIOLOGY

## 2022-07-11 PROCEDURE — 77336 RADIATION PHYSICS CONSULT: CPT | Performed by: RADIOLOGY

## 2022-07-12 PROCEDURE — 77386 HC IMRT, COMPLEX: CPT | Performed by: RADIOLOGY

## 2022-07-13 DIAGNOSIS — E78.5 HYPERLIPIDEMIA, UNSPECIFIED HYPERLIPIDEMIA TYPE: ICD-10-CM

## 2022-07-13 PROCEDURE — 77386 HC IMRT, COMPLEX: CPT | Performed by: RADIOLOGY

## 2022-07-13 RX ORDER — ATORVASTATIN CALCIUM 40 MG/1
40 TABLET, FILM COATED ORAL DAILY
Qty: 90 TABLET | Refills: 1 | Status: SHIPPED | OUTPATIENT
Start: 2022-07-13 | End: 2022-11-08 | Stop reason: SDUPTHER

## 2022-07-14 PROCEDURE — 77386 HC IMRT, COMPLEX: CPT | Performed by: RADIOLOGY

## 2022-07-15 PROCEDURE — 77386 HC IMRT, COMPLEX: CPT | Performed by: RADIOLOGY

## 2022-07-18 PROCEDURE — 77336 RADIATION PHYSICS CONSULT: CPT | Performed by: RADIOLOGY

## 2022-07-18 PROCEDURE — 77386 HC IMRT, COMPLEX: CPT | Performed by: RADIOLOGY

## 2022-07-19 PROCEDURE — 77386 HC IMRT, COMPLEX: CPT | Performed by: RADIOLOGY

## 2022-07-20 PROCEDURE — 77386 HC IMRT, COMPLEX: CPT | Performed by: RADIOLOGY

## 2022-07-21 PROCEDURE — 77386 HC IMRT, COMPLEX: CPT | Performed by: RADIOLOGY

## 2022-07-22 PROCEDURE — 77386 HC IMRT, COMPLEX: CPT | Performed by: RADIOLOGY

## 2022-07-25 PROCEDURE — 77336 RADIATION PHYSICS CONSULT: CPT

## 2022-07-25 PROCEDURE — 77386 HC IMRT, COMPLEX: CPT | Performed by: RADIOLOGY

## 2022-07-26 PROCEDURE — 77386 HC IMRT, COMPLEX: CPT | Performed by: RADIOLOGY

## 2022-07-27 PROCEDURE — 77386 HC IMRT, COMPLEX: CPT | Performed by: RADIOLOGY

## 2022-07-28 PROCEDURE — 77386 HC IMRT, COMPLEX: CPT | Performed by: RADIOLOGY

## 2022-07-29 PROCEDURE — 77386 HC IMRT, COMPLEX: CPT | Performed by: RADIOLOGY

## 2022-08-01 ENCOUNTER — APPOINTMENT (OUTPATIENT)
Dept: RADIATION THERAPY | Facility: HOSPITAL | Age: 62
End: 2022-08-01
Attending: RADIOLOGY
Payer: MEDICAID

## 2022-08-01 PROCEDURE — 77336 RADIATION PHYSICS CONSULT: CPT | Performed by: RADIOLOGY

## 2022-08-01 PROCEDURE — 77386 HC IMRT, COMPLEX: CPT | Performed by: RADIOLOGY

## 2022-08-02 PROCEDURE — 77386 HC IMRT, COMPLEX: CPT | Performed by: RADIOLOGY

## 2022-08-03 PROCEDURE — 77386 HC IMRT, COMPLEX: CPT | Performed by: RADIOLOGY

## 2022-08-04 PROCEDURE — 77386 HC IMRT, COMPLEX: CPT | Performed by: RADIOLOGY

## 2022-08-05 PROCEDURE — 77386 HC IMRT, COMPLEX: CPT | Performed by: RADIOLOGY

## 2022-08-08 PROCEDURE — 77336 RADIATION PHYSICS CONSULT: CPT | Performed by: RADIOLOGY

## 2022-08-08 PROCEDURE — 77386 HC IMRT, COMPLEX: CPT | Performed by: RADIOLOGY

## 2022-08-09 PROCEDURE — 77386 HC IMRT, COMPLEX: CPT | Performed by: RADIOLOGY

## 2022-08-10 PROCEDURE — 77386 HC IMRT, COMPLEX: CPT | Performed by: RADIOLOGY

## 2022-08-15 PROCEDURE — 77336 RADIATION PHYSICS CONSULT: CPT | Performed by: RADIOLOGY

## 2022-08-18 ENCOUNTER — OFFICE VISIT (OUTPATIENT)
Dept: FAMILY MEDICINE | Facility: CLINIC | Age: 62
End: 2022-08-18
Payer: MEDICAID

## 2022-08-18 VITALS
TEMPERATURE: 98 F | BODY MASS INDEX: 33.27 KG/M2 | DIASTOLIC BLOOD PRESSURE: 82 MMHG | HEIGHT: 71 IN | WEIGHT: 237.63 LBS | SYSTOLIC BLOOD PRESSURE: 140 MMHG | OXYGEN SATURATION: 99 % | HEART RATE: 70 BPM

## 2022-08-18 DIAGNOSIS — I10 PRIMARY HYPERTENSION: Primary | ICD-10-CM

## 2022-08-18 LAB
ANION GAP SERPL CALC-SCNC: 12 MEQ/L
BUN SERPL-MCNC: 18 MG/DL (ref 8.4–25.7)
CALCIUM SERPL-MCNC: 9.7 MG/DL (ref 8.8–10)
CHLORIDE SERPL-SCNC: 106 MMOL/L (ref 98–107)
CO2 SERPL-SCNC: 25 MMOL/L (ref 23–31)
CREAT SERPL-MCNC: 0.97 MG/DL (ref 0.73–1.18)
CREAT/UREA NIT SERPL: 19
GFR SERPLBLD CREATININE-BSD FMLA CKD-EPI: >60 MLS/MIN/1.73/M2
GLUCOSE SERPL-MCNC: 94 MG/DL (ref 82–115)
POTASSIUM SERPL-SCNC: 3.8 MMOL/L (ref 3.5–5.1)
SODIUM SERPL-SCNC: 143 MMOL/L (ref 136–145)

## 2022-08-18 PROCEDURE — 80048 BASIC METABOLIC PNL TOTAL CA: CPT

## 2022-08-18 PROCEDURE — 36415 COLL VENOUS BLD VENIPUNCTURE: CPT

## 2022-08-18 PROCEDURE — 99213 OFFICE O/P EST LOW 20 MIN: CPT | Mod: PBBFAC

## 2022-08-18 RX ORDER — TAMSULOSIN HYDROCHLORIDE 0.4 MG/1
1 CAPSULE ORAL NIGHTLY
COMMUNITY
Start: 2022-07-27 | End: 2022-11-08 | Stop reason: SDUPTHER

## 2022-08-18 NOTE — PROGRESS NOTES
"  [facility]  Office Visit Note    Subjective:      Patient ID: Eduin Ortiz, : 1960.    Chief Complaint: Hypertension      62 y.o. male presents for f/u HTN.    HTN:  Started on lisinopril-HCTZ 40 mg-12.5 mg last visit after trial of same dose HCTZ and lisinopril as separate meds.  Compliant, no adverse effects.  Not checking BP regularly at home but has had nl BP at weekly checks at RTX visits for prostate cancer.      ROS  Constitutional: Denies weakness or fatigue   Eyes: Denies vision changes   Respiratory: Denies cough or dyspnea   CV: Denies chest pain, palpitations, syncope, or peripheral edema   GI: Denies nausea or vomiting   Neuro: Denies headache or dizziness       Current Outpatient Medications:     allopurinoL (ZYLOPRIM) 300 MG tablet, Take 300 mg by mouth once daily., Disp: , Rfl:     atorvastatin (LIPITOR) 40 MG tablet, Take 1 tablet (40 mg total) by mouth once daily., Disp: 90 tablet, Rfl: 1    hydroCHLOROthiazide (MICROZIDE) 12.5 mg capsule, Take 1 capsule (12.5 mg total) by mouth once daily., Disp: 90 capsule, Rfl: 1    lisinopriL (PRINIVIL,ZESTRIL) 40 MG tablet, Take 1 tablet (40 mg total) by mouth once daily., Disp: 90 tablet, Rfl: 1    tamsulosin (FLOMAX) 0.4 mg Cap, Take 1 capsule by mouth nightly., Disp: , Rfl:     Objective:     PHYSICAL EXAM  Blood pressure (!) 140/82, pulse 70, temperature 97.5 °F (36.4 °C), temperature source Oral, height 5' 11" (1.803 m), weight 107.8 kg (237 lb 10.5 oz), SpO2 99 %.    General: Pleasant, obese male, alert and oriented, NAD   Eyes: EOMI   Neck: Supple, no thyromegaly   Chest: Respirations nonlabored, CTAB   CV: RRR, no r/g/m, distal pulses intact, no peripheral edema  Abdomen: Soft, nontender, nondistended, normoactive bowel sounds, no renal bruit     BMP:   Lab Results   Component Value Date    CHLORIDE 110 2022    CO2 23 2022    BUN 18.0 2022    CREATININE 0.80 2022    GLUCOSE 108 2022    CALCIUM 9.6 2022 "     LFTs:   Lab Results   Component Value Date    ALBUMIN 4.0 02/03/2022    BILITOT 0.6 02/03/2022    AST 18 02/03/2022    ALKPHOS 67 02/03/2022    ALT 23 02/03/2022     FLP:   Lab Results   Component Value Date    CHOL 142 02/03/2022    HDL 49 02/03/2022    LDL 65.00 02/03/2022    TRIG 138 02/03/2022     DM:   Lab Results   Component Value Date    HGBA1C 5.2 02/03/2022    HGBA1C 5.3 02/04/2021    CREATININE 0.80 02/03/2022     Thyroid:   Lab Results   Component Value Date    TSH 1.0979 02/03/2022       Assessment:     1. Primary hypertension         Plan:     Initial /85 in office today; manual repeat was 140/82.  BP well-controlled with 1 outlier.  Cont HCTZ-lisinopril.  Restart checking BP at home and bring logs to f/u.  Recheck BMP today.      Follow up in about 3 months (around 11/18/2022) for HTN.    Riccardo Yo MD  HO-III  [facility]      Orders Placed This Encounter   Procedures    Basic Metabolic Panel       New Prescriptions    No medications on file     Discontinued Medications    No medications on file     Modified Medications    No medications on file

## 2022-08-23 NOTE — PROGRESS NOTES
Faculty addendum: Patient discussed with resident. Chart was reviewed including vitals, labs, etc. Care provided reasonable and necessary. I participated in the management of the patient and was immediately available throughout the encounter. Services were furnished in a primary care center located in the outpatient department of a [facility]. I agree with the resident's findings and plan as documented in the resident's note.

## 2022-11-08 DIAGNOSIS — E78.5 HYPERLIPIDEMIA, UNSPECIFIED HYPERLIPIDEMIA TYPE: ICD-10-CM

## 2022-11-08 DIAGNOSIS — I10 PRIMARY HYPERTENSION: ICD-10-CM

## 2022-11-08 RX ORDER — LISINOPRIL 40 MG/1
40 TABLET ORAL DAILY
Qty: 90 TABLET | Refills: 1 | Status: SHIPPED | OUTPATIENT
Start: 2022-11-08 | End: 2023-06-19 | Stop reason: SDUPTHER

## 2022-11-08 RX ORDER — TAMSULOSIN HYDROCHLORIDE 0.4 MG/1
1 CAPSULE ORAL NIGHTLY
Qty: 90 CAPSULE | Refills: 1 | Status: SHIPPED | OUTPATIENT
Start: 2022-11-08 | End: 2023-03-08

## 2022-11-08 RX ORDER — HYDROCHLOROTHIAZIDE 12.5 MG/1
12.5 CAPSULE ORAL DAILY
Qty: 90 CAPSULE | Refills: 1 | Status: SHIPPED | OUTPATIENT
Start: 2022-11-08 | End: 2022-12-01

## 2022-11-08 RX ORDER — ATORVASTATIN CALCIUM 40 MG/1
40 TABLET, FILM COATED ORAL DAILY
Qty: 90 TABLET | Refills: 1 | Status: SHIPPED | OUTPATIENT
Start: 2022-11-08 | End: 2023-06-22 | Stop reason: SDUPTHER

## 2022-11-28 ENCOUNTER — LAB VISIT (OUTPATIENT)
Dept: LAB | Facility: HOSPITAL | Age: 62
End: 2022-11-28
Attending: RADIOLOGY
Payer: MEDICAID

## 2022-11-28 DIAGNOSIS — C61 MALIGNANT NEOPLASM OF PROSTATE: ICD-10-CM

## 2022-11-28 DIAGNOSIS — I10 PRIMARY HYPERTENSION: Primary | ICD-10-CM

## 2022-11-28 LAB — PSA SERPL-MCNC: 4.2 NG/ML

## 2022-11-28 PROCEDURE — 36415 COLL VENOUS BLD VENIPUNCTURE: CPT

## 2022-11-28 PROCEDURE — 84153 ASSAY OF PSA TOTAL: CPT

## 2022-12-01 ENCOUNTER — OFFICE VISIT (OUTPATIENT)
Dept: UROLOGY | Facility: CLINIC | Age: 62
End: 2022-12-01
Payer: MEDICAID

## 2022-12-01 ENCOUNTER — OFFICE VISIT (OUTPATIENT)
Dept: FAMILY MEDICINE | Facility: CLINIC | Age: 62
End: 2022-12-01
Payer: MEDICAID

## 2022-12-01 VITALS
BODY MASS INDEX: 33.6 KG/M2 | HEIGHT: 71 IN | TEMPERATURE: 98 F | RESPIRATION RATE: 20 BRPM | WEIGHT: 240 LBS | OXYGEN SATURATION: 100 % | DIASTOLIC BLOOD PRESSURE: 78 MMHG | SYSTOLIC BLOOD PRESSURE: 150 MMHG | HEART RATE: 67 BPM

## 2022-12-01 VITALS
HEART RATE: 70 BPM | DIASTOLIC BLOOD PRESSURE: 93 MMHG | BODY MASS INDEX: 33.01 KG/M2 | SYSTOLIC BLOOD PRESSURE: 167 MMHG | HEIGHT: 71 IN | OXYGEN SATURATION: 97 % | RESPIRATION RATE: 18 BRPM | WEIGHT: 235.81 LBS | TEMPERATURE: 98 F

## 2022-12-01 DIAGNOSIS — I10 PRIMARY HYPERTENSION: Primary | ICD-10-CM

## 2022-12-01 DIAGNOSIS — C61 PROSTATE CANCER: Primary | ICD-10-CM

## 2022-12-01 DIAGNOSIS — N52.35 ERECTILE DYSFUNCTION FOLLOWING RADIATION THERAPY: ICD-10-CM

## 2022-12-01 PROCEDURE — 3080F PR MOST RECENT DIASTOLIC BLOOD PRESSURE >= 90 MM HG: ICD-10-PCS | Mod: CPTII,,, | Performed by: UROLOGY

## 2022-12-01 PROCEDURE — 3077F PR MOST RECENT SYSTOLIC BLOOD PRESSURE >= 140 MM HG: ICD-10-PCS | Mod: CPTII,,, | Performed by: UROLOGY

## 2022-12-01 PROCEDURE — 99214 OFFICE O/P EST MOD 30 MIN: CPT | Mod: PBBFAC,27

## 2022-12-01 PROCEDURE — 99214 OFFICE O/P EST MOD 30 MIN: CPT | Mod: PBBFAC | Performed by: UROLOGY

## 2022-12-01 PROCEDURE — 3008F BODY MASS INDEX DOCD: CPT | Mod: CPTII,,, | Performed by: UROLOGY

## 2022-12-01 PROCEDURE — 3008F PR BODY MASS INDEX (BMI) DOCUMENTED: ICD-10-PCS | Mod: CPTII,,, | Performed by: UROLOGY

## 2022-12-01 PROCEDURE — 99213 PR OFFICE/OUTPT VISIT, EST, LEVL III, 20-29 MIN: ICD-10-PCS | Mod: S$PBB,,, | Performed by: UROLOGY

## 2022-12-01 PROCEDURE — 99213 OFFICE O/P EST LOW 20 MIN: CPT | Mod: S$PBB,,, | Performed by: UROLOGY

## 2022-12-01 PROCEDURE — 4010F ACE/ARB THERAPY RXD/TAKEN: CPT | Mod: CPTII,,, | Performed by: UROLOGY

## 2022-12-01 PROCEDURE — 3080F DIAST BP >= 90 MM HG: CPT | Mod: CPTII,,, | Performed by: UROLOGY

## 2022-12-01 PROCEDURE — 3077F SYST BP >= 140 MM HG: CPT | Mod: CPTII,,, | Performed by: UROLOGY

## 2022-12-01 PROCEDURE — 4010F PR ACE/ARB THEARPY RXD/TAKEN: ICD-10-PCS | Mod: CPTII,,, | Performed by: UROLOGY

## 2022-12-01 RX ORDER — HYDROCHLOROTHIAZIDE 12.5 MG/1
25 CAPSULE ORAL DAILY
Qty: 180 CAPSULE | Refills: 1
Start: 2022-12-01 | End: 2023-01-09 | Stop reason: SDUPTHER

## 2022-12-01 NOTE — PROGRESS NOTES
"[facility]  Office Visit Note    Subjective:      Patient ID: Eduin Ortiz, : 1960.    Chief Complaint: Hypertension and Neck Pain      62 y.o. male presents for follow-up of HTN.    Compliant with HCTZ-lisinopril 12.5 - 40 mg daily.  Denies adverse effects.  Not checking BP at home.  Amenable to increase in antihypertensive regimen.      ROS  Constitutional: Denies weakness or fatigue   Eyes: Denies vision changes   Respiratory: Denies cough or dyspnea   CV: Denies chest pain, palpitations, syncope, or peripheral edema   GI: Denies nausea or vomiting   Neuro: Denies headache or dizziness       Current Outpatient Medications:     allopurinoL (ZYLOPRIM) 300 MG tablet, Take 300 mg by mouth once daily., Disp: , Rfl:     atorvastatin (LIPITOR) 40 MG tablet, Take 1 tablet (40 mg total) by mouth once daily., Disp: 90 tablet, Rfl: 1    lisinopriL (PRINIVIL,ZESTRIL) 40 MG tablet, Take 1 tablet (40 mg total) by mouth once daily., Disp: 90 tablet, Rfl: 1    tamsulosin (FLOMAX) 0.4 mg Cap, Take 1 capsule (0.4 mg total) by mouth nightly., Disp: 90 capsule, Rfl: 1    hydroCHLOROthiazide (MICROZIDE) 12.5 mg capsule, Take 2 capsules (25 mg total) by mouth once daily., Disp: 180 capsule, Rfl: 1    Objective:     PHYSICAL EXAM  Blood pressure (!) 150/78, pulse 67, temperature 97.9 °F (36.6 °C), temperature source Oral, resp. rate 20, height 5' 11" (1.803 m), weight 108.9 kg (240 lb), SpO2 100 %.    General: Pleasant adult male alert and oriented, NAD   Eyes: EOMI   Neck: Supple, no thyromegaly   Chest: Respirations nonlabored, CTAB   CV: RRR, distal pulses intact, no peripheral edema  Abdomen: Soft, nontender, nondistended, normoactive bowel sounds, no renal bruit     Assessment:     1. Primary hypertension         Plan:     BP persistently elevated in office, with SBP returning to < 150 on recheck.  As patient is generally healthy, will increase HCTZ from 12.5 mg daily to 25 mg daily to improve blood pressures toward MARLEN " goals.  Discussed possible adverse effects of hypotension.  However, suspect patient will tolerate new regimen well given office BP.  Continue to recommend home BP checks.    Follow up in about 13 weeks (around 3/2/2023) for new PCP.    Riccardo Yo MD  HO-III  [facility]        New Prescriptions    No medications on file     Discontinued Medications    No medications on file     Modified Medications    Modified Medication Previous Medication    HYDROCHLOROTHIAZIDE (MICROZIDE) 12.5 MG CAPSULE hydroCHLOROthiazide (MICROZIDE) 12.5 mg capsule       Take 2 capsules (25 mg total) by mouth once daily.    Take 1 capsule (12.5 mg total) by mouth once daily.

## 2022-12-01 NOTE — PROGRESS NOTES
Patient seen by Dr. Moran. RTC in 3 months with PSA. Discharge instructions given verbal and written.

## 2022-12-01 NOTE — PROGRESS NOTES
CC:  Follow-up prostate cancer    HPI:  Eduin Ortiz is a 62 y.o. male seen for follow-up of prostate cancer.  The patient had a PSA of 5.82 on 3 February 2022.  He underwent a prostate biopsy on 31 May 2022.  The biopsy showed [place] grade 3+3 in the right and left apex.  He elected to have EBRT which he completed in August.  This is the first PSA since finishing treatment.   He was on Flomax while on radiation therapy but stopped taking it and has no urinary complaints.   He has erectile dysfunction but does not desire treatment.      Lab Results:  Recent Labs     11/28/22  0921   PSA 4.20*     PSA review:    4 February 2021:  5.38    9 February 2021:  4.59    3 February 2022:  5.82    Data Review:  PSA.      ROS:  All systems reviewed and are negative except as documented in HPI and/or Assessment and Plan.     Patient Active Problem List:     Patient Active Problem List   Diagnosis    Elevated PSA    Hypertension    HLD (hyperlipidemia)    Tinea versicolor    Prostate cancer    Erectile dysfunction        Past Medical History:  Past Medical History:   Diagnosis Date    Gout, unspecified     HLD (hyperlipidemia)     Hypertension     Obesity, Class I, BMI 30-34.9     Tinea versicolor         Past Surgical History:  Past Surgical History:   Procedure Laterality Date    APPENDECTOMY      CIRCUMCISION      ENDOSCOPY      INNER EAR SURGERY      KNEE CARTILAGE SURGERY      TONSILLECTOMY      TRANSRECTAL BIOPSY OF PROSTATE WITH ULTRASOUND GUIDANCE N/A 5/31/2022    Procedure: BIOPSY, PROSTATE, RECTAL APPROACH, WITH US GUIDANCE;  Surgeon: Colton Herrera MD;  Location: [facility];  Service: Urology;  Laterality: N/A;        Family History:  Family History   Problem Relation Age of Onset    Lung cancer Mother     Liver cancer Father     Drug abuse Sister     Lung cancer Brother         Social History:  Social History     Socioeconomic History    Marital status:    Tobacco Use    Smoking status: Never      Passive exposure: Never    Smokeless tobacco: Never   Substance and Sexual Activity    Alcohol use: Yes     Alcohol/week: 4.0 standard drinks     Types: 4 Cans of beer per week     Comment: 1-2 times weekly    Drug use: Not Currently     Types: Marijuana        Allergies:  Review of patient's allergies indicates:  No Known Allergies     Objective:  Vitals:    12/01/22 0724   BP: (!) 167/93   Pulse: 70   Resp: 18   Temp: 97.7 °F (36.5 °C)     General:  Well developed, well nourished adult male in no acute distress  Abdomen: Soft, nontender, no masses  Extremities:  No clubbing, cyanosis, or edema  Neurologic:  Grossly intact  Musculoskeletal:  Normal tone    Assessment:  1. Prostate cancer  - PSA, Total (Diagnostic); Future    2. Erectile dysfunction following radiation therapy     Plan:  Continue close observation with a PSA in three months.    He does not desire treatment today.      Follow-up:  Three months after PSA.

## 2022-12-08 DIAGNOSIS — M10.9 GOUT, UNSPECIFIED CAUSE, UNSPECIFIED CHRONICITY, UNSPECIFIED SITE: Primary | ICD-10-CM

## 2022-12-08 RX ORDER — ALLOPURINOL 300 MG/1
300 TABLET ORAL DAILY
Qty: 30 TABLET | Refills: 0 | Status: SHIPPED | OUTPATIENT
Start: 2022-12-08 | End: 2022-12-19 | Stop reason: SDUPTHER

## 2022-12-19 ENCOUNTER — TELEPHONE (OUTPATIENT)
Dept: FAMILY MEDICINE | Facility: CLINIC | Age: 62
End: 2022-12-19
Payer: MEDICAID

## 2022-12-19 DIAGNOSIS — M10.9 GOUT, UNSPECIFIED CAUSE, UNSPECIFIED CHRONICITY, UNSPECIFIED SITE: Primary | ICD-10-CM

## 2022-12-19 DIAGNOSIS — M10.9 ACUTE GOUT, UNSPECIFIED CAUSE, UNSPECIFIED SITE: Primary | ICD-10-CM

## 2022-12-19 RX ORDER — ALLOPURINOL 300 MG/1
300 TABLET ORAL DAILY
Qty: 90 TABLET | Refills: 1 | Status: SHIPPED | OUTPATIENT
Start: 2022-12-19 | End: 2023-06-22 | Stop reason: SDUPTHER

## 2022-12-20 RX ORDER — COLCHICINE 0.6 MG/1
TABLET ORAL
Qty: 3 TABLET | Refills: 0 | Status: SHIPPED | OUTPATIENT
Start: 2022-12-20 | End: 2022-12-23 | Stop reason: SDUPTHER

## 2022-12-23 DIAGNOSIS — M10.9 ACUTE GOUT, UNSPECIFIED CAUSE, UNSPECIFIED SITE: ICD-10-CM

## 2022-12-23 RX ORDER — COLCHICINE 0.6 MG/1
TABLET ORAL
Qty: 20 TABLET | Refills: 0 | Status: SHIPPED | OUTPATIENT
Start: 2022-12-23 | End: 2023-03-08 | Stop reason: SDUPTHER

## 2023-01-09 DIAGNOSIS — I10 PRIMARY HYPERTENSION: ICD-10-CM

## 2023-01-09 RX ORDER — HYDROCHLOROTHIAZIDE 12.5 MG/1
25 CAPSULE ORAL DAILY
Qty: 180 CAPSULE | Refills: 1
Start: 2023-01-09 | End: 2023-03-08

## 2023-03-01 ENCOUNTER — TELEPHONE (OUTPATIENT)
Dept: UROLOGY | Facility: CLINIC | Age: 63
End: 2023-03-01
Payer: MEDICAID

## 2023-03-01 ENCOUNTER — LAB VISIT (OUTPATIENT)
Dept: LAB | Facility: HOSPITAL | Age: 63
End: 2023-03-01
Attending: NURSE PRACTITIONER
Payer: MEDICAID

## 2023-03-01 DIAGNOSIS — C61 PROSTATE CANCER: ICD-10-CM

## 2023-03-01 LAB — PSA SERPL-MCNC: 4.26 NG/ML

## 2023-03-01 PROCEDURE — 36415 COLL VENOUS BLD VENIPUNCTURE: CPT

## 2023-03-01 PROCEDURE — 84153 ASSAY OF PSA TOTAL: CPT

## 2023-03-02 ENCOUNTER — OFFICE VISIT (OUTPATIENT)
Dept: UROLOGY | Facility: CLINIC | Age: 63
End: 2023-03-02
Payer: MEDICAID

## 2023-03-02 VITALS
RESPIRATION RATE: 20 BRPM | BODY MASS INDEX: 33.43 KG/M2 | HEIGHT: 71 IN | WEIGHT: 238.75 LBS | OXYGEN SATURATION: 97 % | SYSTOLIC BLOOD PRESSURE: 129 MMHG | DIASTOLIC BLOOD PRESSURE: 77 MMHG | TEMPERATURE: 98 F | HEART RATE: 78 BPM

## 2023-03-02 DIAGNOSIS — C61 PROSTATE CANCER: Primary | ICD-10-CM

## 2023-03-02 PROCEDURE — 3074F SYST BP LT 130 MM HG: CPT | Mod: CPTII,,, | Performed by: UROLOGY

## 2023-03-02 PROCEDURE — 3074F PR MOST RECENT SYSTOLIC BLOOD PRESSURE < 130 MM HG: ICD-10-PCS | Mod: CPTII,,, | Performed by: UROLOGY

## 2023-03-02 PROCEDURE — 1159F MED LIST DOCD IN RCRD: CPT | Mod: CPTII,,, | Performed by: UROLOGY

## 2023-03-02 PROCEDURE — 3078F PR MOST RECENT DIASTOLIC BLOOD PRESSURE < 80 MM HG: ICD-10-PCS | Mod: CPTII,,, | Performed by: UROLOGY

## 2023-03-02 PROCEDURE — 1160F PR REVIEW ALL MEDS BY PRESCRIBER/CLIN PHARMACIST DOCUMENTED: ICD-10-PCS | Mod: CPTII,,, | Performed by: UROLOGY

## 2023-03-02 PROCEDURE — 99213 OFFICE O/P EST LOW 20 MIN: CPT | Mod: S$PBB,,, | Performed by: UROLOGY

## 2023-03-02 PROCEDURE — 4010F ACE/ARB THERAPY RXD/TAKEN: CPT | Mod: CPTII,,, | Performed by: UROLOGY

## 2023-03-02 PROCEDURE — 4010F PR ACE/ARB THEARPY RXD/TAKEN: ICD-10-PCS | Mod: CPTII,,, | Performed by: UROLOGY

## 2023-03-02 PROCEDURE — 3078F DIAST BP <80 MM HG: CPT | Mod: CPTII,,, | Performed by: UROLOGY

## 2023-03-02 PROCEDURE — 3008F BODY MASS INDEX DOCD: CPT | Mod: CPTII,,, | Performed by: UROLOGY

## 2023-03-02 PROCEDURE — 1160F RVW MEDS BY RX/DR IN RCRD: CPT | Mod: CPTII,,, | Performed by: UROLOGY

## 2023-03-02 PROCEDURE — 3008F PR BODY MASS INDEX (BMI) DOCUMENTED: ICD-10-PCS | Mod: CPTII,,, | Performed by: UROLOGY

## 2023-03-02 PROCEDURE — 99213 PR OFFICE/OUTPT VISIT, EST, LEVL III, 20-29 MIN: ICD-10-PCS | Mod: S$PBB,,, | Performed by: UROLOGY

## 2023-03-02 PROCEDURE — 1159F PR MEDICATION LIST DOCUMENTED IN MEDICAL RECORD: ICD-10-PCS | Mod: CPTII,,, | Performed by: UROLOGY

## 2023-03-02 PROCEDURE — 99215 OFFICE O/P EST HI 40 MIN: CPT | Mod: PBBFAC | Performed by: UROLOGY

## 2023-03-02 NOTE — PROGRESS NOTES
Placed in room. Seen by Dr. Boogie. Spoke with patient. Will need a CT and bone scan. RTC in 6 weeks with a PSA.

## 2023-03-02 NOTE — PROGRESS NOTES
CC:  Prostate cancer    HPI:  Eduin Ortiz is a 63 y.o. male seen for follow-up of prostate cancer.  The patient had a PSA of 5.82 on 3 February 2022.  He underwent a prostate biopsy on 31 May 2022.  The biopsy showed [place] grade 3+3 in the right and left apex.  He elected to have EBRT which he completed in August 2022.  His last PSA on 28 November 2022 was 4.20.  He has no urinary complaints.      Lab Results:  Recent Labs     03/01/23  0910   PSA 4.26*     ROS:  All systems reviewed and are negative except as documented in HPI and/or Assessment and Plan.     Patient Active Problem List:     Patient Active Problem List   Diagnosis    Elevated PSA    Hypertension    HLD (hyperlipidemia)    Tinea versicolor    Prostate cancer    Erectile dysfunction        Past Medical History:  Past Medical History:   Diagnosis Date    Gout, unspecified     HLD (hyperlipidemia)     Hypertension     Obesity, Class I, BMI 30-34.9     Tinea versicolor         Past Surgical History:  Past Surgical History:   Procedure Laterality Date    APPENDECTOMY      CIRCUMCISION      ENDOSCOPY      INNER EAR SURGERY      KNEE CARTILAGE SURGERY      TONSILLECTOMY      TRANSRECTAL BIOPSY OF PROSTATE WITH ULTRASOUND GUIDANCE N/A 5/31/2022    Procedure: BIOPSY, PROSTATE, RECTAL APPROACH, WITH US GUIDANCE;  Surgeon: Colton Herrera MD;  Location: [facility] ENDOSCOPY;  Service: Urology;  Laterality: N/A;        Family History:  Family History   Problem Relation Age of Onset    Lung cancer Mother     Liver cancer Father     Drug abuse Sister     Lung cancer Brother         Social History:  Social History     Socioeconomic History    Marital status:    Tobacco Use    Smoking status: Never     Passive exposure: Never    Smokeless tobacco: Never   Substance and Sexual Activity    Alcohol use: Yes     Alcohol/week: 4.0 standard drinks     Types: 4 Cans of beer per week     Comment: 1-2 times weekly    Drug use: Not Currently     Types: Marijuana         Allergies:  Review of patient's allergies indicates:  No Known Allergies     Objective:  Vitals:    03/02/23 0749   BP: 129/77   Pulse: 78   Resp: 20   Temp: 97.7 °F (36.5 °C)     General:  Well developed, well nourished adult male in no acute distress  Abdomen: Soft, nontender, no masses  Extremities:  No clubbing, cyanosis, or edema  Neurologic:  Grossly intact  Musculoskeletal:  Normal tone    Assessment:  1. Prostate cancer  - CT Abdomen Pelvis W Wo Contrast; Future  - NM Bone Scan Whole Body; Future  - Creatinine, serum; Future  - PSA, Total (Diagnostic); Future     Plan:  The PSA is stable but is not coming down as expected after radiation therapy.  He never had imaging for staging so we will get a CT scan of the abdomen and pelvis and a bone scan.    Follow-up:  After CT and bone scan.  We will have him get a PSA prior to that visit.

## 2023-03-08 ENCOUNTER — OFFICE VISIT (OUTPATIENT)
Dept: FAMILY MEDICINE | Facility: CLINIC | Age: 63
End: 2023-03-08
Payer: MEDICAID

## 2023-03-08 VITALS
TEMPERATURE: 98 F | RESPIRATION RATE: 20 BRPM | HEIGHT: 70 IN | HEART RATE: 72 BPM | OXYGEN SATURATION: 98 % | SYSTOLIC BLOOD PRESSURE: 130 MMHG | BODY MASS INDEX: 34.36 KG/M2 | WEIGHT: 240 LBS | DIASTOLIC BLOOD PRESSURE: 76 MMHG

## 2023-03-08 DIAGNOSIS — M10.9 ACUTE GOUT, UNSPECIFIED CAUSE, UNSPECIFIED SITE: ICD-10-CM

## 2023-03-08 DIAGNOSIS — Z28.21 IMMUNIZATION DECLINED: ICD-10-CM

## 2023-03-08 DIAGNOSIS — I10 HYPERTENSION, UNSPECIFIED TYPE: Primary | ICD-10-CM

## 2023-03-08 LAB
ALBUMIN SERPL-MCNC: 4.4 G/DL (ref 3.4–4.8)
ALBUMIN/GLOB SERPL: 1 RATIO (ref 1.1–2)
ALP SERPL-CCNC: 90 UNIT/L (ref 40–150)
ALT SERPL-CCNC: 46 UNIT/L (ref 0–55)
AST SERPL-CCNC: 25 UNIT/L (ref 5–34)
BILIRUBIN DIRECT+TOT PNL SERPL-MCNC: 0.4 MG/DL
BUN SERPL-MCNC: 21.9 MG/DL (ref 8.4–25.7)
CALCIUM SERPL-MCNC: 10 MG/DL (ref 8.8–10)
CHLORIDE SERPL-SCNC: 106 MMOL/L (ref 98–107)
CHOLEST SERPL-MCNC: 137 MG/DL
CHOLEST/HDLC SERPL: 4 {RATIO} (ref 0–5)
CO2 SERPL-SCNC: 26 MMOL/L (ref 23–31)
CREAT SERPL-MCNC: 1.01 MG/DL (ref 0.73–1.18)
GFR SERPLBLD CREATININE-BSD FMLA CKD-EPI: >60 MLS/MIN/1.73/M2
GLOBULIN SER-MCNC: 4.3 GM/DL (ref 2.4–3.5)
GLUCOSE SERPL-MCNC: 93 MG/DL (ref 82–115)
HCV AB SERPL QL IA: NONREACTIVE
HDLC SERPL-MCNC: 38 MG/DL (ref 35–60)
HIV 1+2 AB+HIV1 P24 AG SERPL QL IA: NONREACTIVE
LDLC SERPL CALC-MCNC: 52 MG/DL (ref 50–140)
POTASSIUM SERPL-SCNC: 4.2 MMOL/L (ref 3.5–5.1)
PROT SERPL-MCNC: 8.7 GM/DL (ref 5.8–7.6)
SODIUM SERPL-SCNC: 142 MMOL/L (ref 136–145)
TRIGL SERPL-MCNC: 236 MG/DL (ref 34–140)
VLDLC SERPL CALC-MCNC: 47 MG/DL

## 2023-03-08 PROCEDURE — 36415 COLL VENOUS BLD VENIPUNCTURE: CPT

## 2023-03-08 PROCEDURE — 86803 HEPATITIS C AB TEST: CPT

## 2023-03-08 PROCEDURE — 80061 LIPID PANEL: CPT

## 2023-03-08 PROCEDURE — 99214 OFFICE O/P EST MOD 30 MIN: CPT | Mod: PBBFAC

## 2023-03-08 PROCEDURE — 80053 COMPREHEN METABOLIC PANEL: CPT

## 2023-03-08 PROCEDURE — 87389 HIV-1 AG W/HIV-1&-2 AB AG IA: CPT

## 2023-03-08 RX ORDER — COLCHICINE 0.6 MG/1
TABLET ORAL
Qty: 20 TABLET | Refills: 2 | Status: SHIPPED | OUTPATIENT
Start: 2023-03-08 | End: 2023-06-22 | Stop reason: SDUPTHER

## 2023-03-08 RX ORDER — HYDROCHLOROTHIAZIDE 25 MG/1
25 TABLET ORAL DAILY
COMMUNITY
Start: 2023-01-09 | End: 2023-06-22 | Stop reason: SDUPTHER

## 2023-03-08 NOTE — PROGRESS NOTES
[facility]     62 y/o male here for routine f/u.     Acute issues/CC:  Feels like a flare gout is coming in his left foot. He has had gout for many years. He takes Allopurinol daily and has been compliant with the medication. He normally takes Colchicine to relieve his pain but has ran out and is requesting a refill. He denies any recent injury.    Chronic issues:  HTN   -Currently takes HCTZ 25 mg and Lisinopril 40 mg daily  -At his prior visit his HCTZ was increased from 12.5 mg to 25 mg and patient was encouraged to check BP at home  -Denies chest pain, SOB, abdominal pain, N/V, diaphoresis    HLD  -Currently takes Lipitor 40 mg  -Denies muscle cramps     Hx of Prostate Cancer   -PSA 5.38 on 2/4/21; most recent PSA 4.26   on 3/1/23  -5/31/22 Biopsy showed adenocarcinoma, ruth 3 +3  -Completed EBRT in August 2022  -Will have nuclear bone scan done tomorrow   -Denies any dysuria, urinary dribbling/hesitancy    Healthcare maintenance   Hep C and HIV due, will check today  -Colorectal screening- gets them done with Dr. Medrano every 5 years, 2 polyps were removed during the last colonoscopy; next colonoscopy due at age 66. Patient will bring records  -Flu vaccine due: Declined today in office 3/8    ROS   See above     PE   Vitals:    03/08/23 0812   BP: 130/76   Pulse: 72   Resp: 20   Temp: 98 °F (36.7 °C)   General: pleasant and cooperative male in no acute distress  Lungs: Clear to auscultation bilaterally   Heart: RRR      A/P   HTN   -Better controlled since increasing HCTZ 25 mg  -Continue current regimen   -Recommend starting checking BP at home  -Encouraged Mediterranean diet and regular aerobic exercise for 150 mins/week     HLD  -Continue current regimen    Healthcare maintenance   -CMP, HIV, Hep C ordered    Immunization Declined   -Declined Shingles and Flu vaccine today in office 3/8

## 2023-03-09 ENCOUNTER — HOSPITAL ENCOUNTER (OUTPATIENT)
Dept: RADIOLOGY | Facility: HOSPITAL | Age: 63
Discharge: HOME | End: 2023-03-09
Attending: UROLOGY
Payer: MEDICAID

## 2023-03-09 DIAGNOSIS — C61 PROSTATE CANCER: ICD-10-CM

## 2023-03-09 PROCEDURE — 78306 BONE IMAGING WHOLE BODY: CPT | Mod: TC

## 2023-03-09 PROCEDURE — A9503 TC99M MEDRONATE: HCPCS

## 2023-03-09 PROCEDURE — 74178 CT ABD&PLV WO CNTR FLWD CNTR: CPT | Mod: TC

## 2023-03-10 ENCOUNTER — TELEPHONE (OUTPATIENT)
Dept: UROLOGY | Facility: CLINIC | Age: 63
End: 2023-03-10
Payer: MEDICAID

## 2023-03-10 NOTE — TELEPHONE ENCOUNTER
He had a bone scan and CT.  His follow-up is not until May but he needs to be seen sooner.  Please get him in as soon as possible after I get back.  Thank you.

## 2023-03-28 ENCOUNTER — LAB VISIT (OUTPATIENT)
Dept: LAB | Facility: HOSPITAL | Age: 63
End: 2023-03-28
Attending: UROLOGY
Payer: MEDICAID

## 2023-03-28 DIAGNOSIS — C61 PROSTATE CANCER: ICD-10-CM

## 2023-03-28 LAB
CREAT SERPL-MCNC: 1.09 MG/DL (ref 0.73–1.18)
GFR SERPLBLD CREATININE-BSD FMLA CKD-EPI: >60 MLS/MIN/1.73/M2

## 2023-03-28 PROCEDURE — 82565 ASSAY OF CREATININE: CPT

## 2023-03-28 PROCEDURE — 36415 COLL VENOUS BLD VENIPUNCTURE: CPT

## 2023-03-29 ENCOUNTER — OFFICE VISIT (OUTPATIENT)
Dept: UROLOGY | Facility: CLINIC | Age: 63
End: 2023-03-29
Payer: MEDICAID

## 2023-03-29 VITALS
TEMPERATURE: 98 F | WEIGHT: 238.38 LBS | DIASTOLIC BLOOD PRESSURE: 77 MMHG | HEIGHT: 71 IN | HEART RATE: 67 BPM | OXYGEN SATURATION: 98 % | SYSTOLIC BLOOD PRESSURE: 145 MMHG | RESPIRATION RATE: 18 BRPM | BODY MASS INDEX: 33.37 KG/M2

## 2023-03-29 DIAGNOSIS — C61 PROSTATE CANCER: Primary | ICD-10-CM

## 2023-03-29 PROCEDURE — 99214 OFFICE O/P EST MOD 30 MIN: CPT | Mod: PBBFAC | Performed by: UROLOGY

## 2023-03-29 PROCEDURE — 3077F SYST BP >= 140 MM HG: CPT | Mod: CPTII,,, | Performed by: UROLOGY

## 2023-03-29 PROCEDURE — 99214 OFFICE O/P EST MOD 30 MIN: CPT | Mod: S$PBB,,, | Performed by: UROLOGY

## 2023-03-29 PROCEDURE — 3077F PR MOST RECENT SYSTOLIC BLOOD PRESSURE >= 140 MM HG: ICD-10-PCS | Mod: CPTII,,, | Performed by: UROLOGY

## 2023-03-29 PROCEDURE — 3078F PR MOST RECENT DIASTOLIC BLOOD PRESSURE < 80 MM HG: ICD-10-PCS | Mod: CPTII,,, | Performed by: UROLOGY

## 2023-03-29 PROCEDURE — 3008F BODY MASS INDEX DOCD: CPT | Mod: CPTII,,, | Performed by: UROLOGY

## 2023-03-29 PROCEDURE — 4010F PR ACE/ARB THEARPY RXD/TAKEN: ICD-10-PCS | Mod: CPTII,,, | Performed by: UROLOGY

## 2023-03-29 PROCEDURE — 1159F MED LIST DOCD IN RCRD: CPT | Mod: CPTII,,, | Performed by: UROLOGY

## 2023-03-29 PROCEDURE — 1160F PR REVIEW ALL MEDS BY PRESCRIBER/CLIN PHARMACIST DOCUMENTED: ICD-10-PCS | Mod: CPTII,,, | Performed by: UROLOGY

## 2023-03-29 PROCEDURE — 3008F PR BODY MASS INDEX (BMI) DOCUMENTED: ICD-10-PCS | Mod: CPTII,,, | Performed by: UROLOGY

## 2023-03-29 PROCEDURE — 4010F ACE/ARB THERAPY RXD/TAKEN: CPT | Mod: CPTII,,, | Performed by: UROLOGY

## 2023-03-29 PROCEDURE — 99214 PR OFFICE/OUTPT VISIT, EST, LEVL IV, 30-39 MIN: ICD-10-PCS | Mod: S$PBB,,, | Performed by: UROLOGY

## 2023-03-29 PROCEDURE — 1160F RVW MEDS BY RX/DR IN RCRD: CPT | Mod: CPTII,,, | Performed by: UROLOGY

## 2023-03-29 PROCEDURE — 1159F PR MEDICATION LIST DOCUMENTED IN MEDICAL RECORD: ICD-10-PCS | Mod: CPTII,,, | Performed by: UROLOGY

## 2023-03-29 PROCEDURE — 3078F DIAST BP <80 MM HG: CPT | Mod: CPTII,,, | Performed by: UROLOGY

## 2023-03-29 NOTE — PROGRESS NOTES
Pt seen by Dr. Moran; PET scan ordered & pt given centralized scheduling information sheet; Pt instructed to return in 1 month; Discharge paperwork given w/pt verbalizing understanding

## 2023-03-30 NOTE — PROGRESS NOTES
CC:  Imaging results    HPI:  Eduin Ortiz is a 63 y.o. male seen for follow-up of imaging done for restaging of prostate cancer.  The patient had a PSA of 5.82 on 3 February 2022.  He underwent a prostate biopsy on 31 May 2022.  The biopsy showed [place] grade 3+3 in the right and left apex.  He elected to have EBRT which he completed in August 2022.  He had a PSA on 28 November 2022 was 4.20.  Most recent PSA on 1 March 2023 was stable at 4.26.      Lab Results:  Recent Labs     03/01/23  0910   PSA 4.26*       Recent Labs     03/28/23  0707   CREATININE 1.09       Imaging:  Bone scan - 9 March 2023:  Findings are concerning for focal osseous metastasis at the right 6th and 10th ribs.  CT - 9 March 2023:  Heterogenous and slightly enlarged prostate.    ROS:  All systems reviewed and are negative except as documented in HPI and/or Assessment and Plan.     Patient Active Problem List:     Patient Active Problem List   Diagnosis    Elevated PSA    Hypertension    HLD (hyperlipidemia)    Tinea versicolor    Prostate cancer    Erectile dysfunction        Past Medical History:  Past Medical History:   Diagnosis Date    Gout, unspecified     HLD (hyperlipidemia)     Hypertension     Obesity, Class I, BMI 30-34.9     Tinea versicolor         Past Surgical History:  Past Surgical History:   Procedure Laterality Date    APPENDECTOMY      CIRCUMCISION      ENDOSCOPY      INNER EAR SURGERY      KNEE CARTILAGE SURGERY      TONSILLECTOMY      TRANSRECTAL BIOPSY OF PROSTATE WITH ULTRASOUND GUIDANCE N/A 5/31/2022    Procedure: BIOPSY, PROSTATE, RECTAL APPROACH, WITH US GUIDANCE;  Surgeon: Colton Herrera MD;  Location: [facility] ENDOSCOPY;  Service: Urology;  Laterality: N/A;        Family History:  Family History   Problem Relation Age of Onset    Lung cancer Mother     Liver cancer Father     Drug abuse Sister     Lung cancer Brother         Social History:  Social History     Socioeconomic History    Marital status:     Tobacco Use    Smoking status: Never     Passive exposure: Never    Smokeless tobacco: Never   Substance and Sexual Activity    Alcohol use: Yes     Alcohol/week: 4.0 standard drinks     Types: 4 Cans of beer per week     Comment: 1-2 times weekly    Drug use: Not Currently     Types: Marijuana        Allergies:  Review of patient's allergies indicates:  No Known Allergies     Objective:  Vitals:    03/29/23 1425   BP: (!) 145/77   Pulse: 67   Resp: 18   Temp: 98 °F (36.7 °C)     General:  Well developed, well nourished adult male in no acute distress  Abdomen: Soft, nontender, no masses  Extremities:  No clubbing, cyanosis, or edema  Neurologic:  Grossly intact  Musculoskeletal:  Normal tone    Assessment:  1. Prostate cancer  - NM PET CT GA68 PSMA, midthigh to vertex; Future     Plan:  With the findings on bone scan and the persistently elevated PSA he needs a PSMA PET CT to determine if he does indeed have metastatic disease before committing him to lifelong hormonal manipulation.    Follow-up:  After a PET scan.    I spent a total of 30 minutes on the day of the visit.This includes face to face time and non-face to face time preparing to see the patient (eg, review of tests), obtaining and/or reviewing separately obtained history, documenting clinical information in the electronic or other health record, independently interpreting results and communicating results to the patient/family/caregiver, or care coordinator.

## 2023-04-13 ENCOUNTER — HOSPITAL ENCOUNTER (OUTPATIENT)
Dept: RADIOLOGY | Facility: HOSPITAL | Age: 63
Discharge: HOME | End: 2023-04-13
Attending: UROLOGY
Payer: MEDICAID

## 2023-04-13 DIAGNOSIS — C61 PROSTATE CANCER: ICD-10-CM

## 2023-04-13 PROCEDURE — 78815 PET IMAGE W/CT SKULL-THIGH: CPT | Mod: TC

## 2023-04-25 ENCOUNTER — LAB VISIT (OUTPATIENT)
Dept: LAB | Facility: HOSPITAL | Age: 63
End: 2023-04-25
Attending: UROLOGY
Payer: MEDICAID

## 2023-04-25 DIAGNOSIS — C61 PROSTATE CANCER: ICD-10-CM

## 2023-04-25 LAB — PSA SERPL-MCNC: 2.16 NG/ML

## 2023-04-25 PROCEDURE — 84153 ASSAY OF PSA TOTAL: CPT

## 2023-04-25 PROCEDURE — 36415 COLL VENOUS BLD VENIPUNCTURE: CPT

## 2023-05-03 ENCOUNTER — OFFICE VISIT (OUTPATIENT)
Dept: UROLOGY | Facility: CLINIC | Age: 63
End: 2023-05-03
Payer: MEDICAID

## 2023-05-03 VITALS
SYSTOLIC BLOOD PRESSURE: 138 MMHG | BODY MASS INDEX: 32.9 KG/M2 | HEIGHT: 71 IN | OXYGEN SATURATION: 98 % | RESPIRATION RATE: 19 BRPM | DIASTOLIC BLOOD PRESSURE: 71 MMHG | TEMPERATURE: 98 F | WEIGHT: 235 LBS | HEART RATE: 70 BPM

## 2023-05-03 DIAGNOSIS — C61 PROSTATE CANCER: Primary | ICD-10-CM

## 2023-05-03 PROCEDURE — 3008F BODY MASS INDEX DOCD: CPT | Mod: CPTII,,, | Performed by: UROLOGY

## 2023-05-03 PROCEDURE — 3075F PR MOST RECENT SYSTOLIC BLOOD PRESS GE 130-139MM HG: ICD-10-PCS | Mod: CPTII,,, | Performed by: UROLOGY

## 2023-05-03 PROCEDURE — 4010F ACE/ARB THERAPY RXD/TAKEN: CPT | Mod: CPTII,,, | Performed by: UROLOGY

## 2023-05-03 PROCEDURE — 1160F RVW MEDS BY RX/DR IN RCRD: CPT | Mod: CPTII,,, | Performed by: UROLOGY

## 2023-05-03 PROCEDURE — 1159F MED LIST DOCD IN RCRD: CPT | Mod: CPTII,,, | Performed by: UROLOGY

## 2023-05-03 PROCEDURE — 3008F PR BODY MASS INDEX (BMI) DOCUMENTED: ICD-10-PCS | Mod: CPTII,,, | Performed by: UROLOGY

## 2023-05-03 PROCEDURE — 4010F PR ACE/ARB THEARPY RXD/TAKEN: ICD-10-PCS | Mod: CPTII,,, | Performed by: UROLOGY

## 2023-05-03 PROCEDURE — 3075F SYST BP GE 130 - 139MM HG: CPT | Mod: CPTII,,, | Performed by: UROLOGY

## 2023-05-03 PROCEDURE — 99214 OFFICE O/P EST MOD 30 MIN: CPT | Mod: PBBFAC | Performed by: UROLOGY

## 2023-05-03 PROCEDURE — 99214 PR OFFICE/OUTPT VISIT, EST, LEVL IV, 30-39 MIN: ICD-10-PCS | Mod: S$PBB,,, | Performed by: UROLOGY

## 2023-05-03 PROCEDURE — 1159F PR MEDICATION LIST DOCUMENTED IN MEDICAL RECORD: ICD-10-PCS | Mod: CPTII,,, | Performed by: UROLOGY

## 2023-05-03 PROCEDURE — 3078F PR MOST RECENT DIASTOLIC BLOOD PRESSURE < 80 MM HG: ICD-10-PCS | Mod: CPTII,,, | Performed by: UROLOGY

## 2023-05-03 PROCEDURE — 99214 OFFICE O/P EST MOD 30 MIN: CPT | Mod: S$PBB,,, | Performed by: UROLOGY

## 2023-05-03 PROCEDURE — 3078F DIAST BP <80 MM HG: CPT | Mod: CPTII,,, | Performed by: UROLOGY

## 2023-05-03 PROCEDURE — 1160F PR REVIEW ALL MEDS BY PRESCRIBER/CLIN PHARMACIST DOCUMENTED: ICD-10-PCS | Mod: CPTII,,, | Performed by: UROLOGY

## 2023-05-03 NOTE — PROGRESS NOTES
CC:  Imaging results    HPI:  Eduin Ortiz is a 63 y.o. male seen for follow-up of prostate cancer.  The patient had a PSA of 5.82 on 3 February 2022.  He underwent a prostate biopsy on 31 May 2022.  The biopsy showed [place] grade 3+3 in the right and left apex.  He elected to have EBRT which he completed in August 2022.  He had a PSA on 28 November 2022 was 4.20 and a repeat PSA on 1 March 2023 was stable at 4.26.  His PSA for today is down significantly to 2.16.  With the lack of response in his PSA to the radiation therapy I obtained a PET scan.     Lab Results:  Recent Labs     04/25/23  0701   PSA 2.16       Imaging:  PSMA PET CT - 13 April 2023:  There is no discrete or focally hyperactive prostate lesion with a relatively diffuse heterogenous background uptake throughout the prostate.  There are scattered areas of skeletal uptake which are somewhat equivocal but overall appearance is concerning for metastatic disease.  There are no definite findings to suggest directly invasive extraprostatic disease or non osseous distant metastasis.      Again I have already talked of its about that prostatectomy ROS:  All systems reviewed and are negative except as documented in HPI and/or Assessment and Plan.     Patient Active Problem List:     Patient Active Problem List   Diagnosis    Elevated PSA    Hypertension    HLD (hyperlipidemia)    Tinea versicolor    Prostate cancer    Erectile dysfunction        Past Medical History:  Past Medical History:   Diagnosis Date    Gout, unspecified     HLD (hyperlipidemia)     Hypertension     Obesity, Class I, BMI 30-34.9     Tinea versicolor         Past Surgical History:  Past Surgical History:   Procedure Laterality Date    APPENDECTOMY      CIRCUMCISION      ENDOSCOPY      INNER EAR SURGERY      KNEE CARTILAGE SURGERY      TONSILLECTOMY      TRANSRECTAL BIOPSY OF PROSTATE WITH ULTRASOUND GUIDANCE N/A 5/31/2022    Procedure: BIOPSY, PROSTATE, RECTAL APPROACH, WITH US  GUIDANCE;  Surgeon: Colton Herrera MD;  Location: [facility] ENDOSCOPY;  Service: Urology;  Laterality: N/A;        Family History:  Family History   Problem Relation Age of Onset    Lung cancer Mother     Liver cancer Father     Drug abuse Sister     Lung cancer Brother         Social History:  Social History     Socioeconomic History    Marital status:    Tobacco Use    Smoking status: Never     Passive exposure: Never    Smokeless tobacco: Never   Substance and Sexual Activity    Alcohol use: Yes     Alcohol/week: 4.0 standard drinks     Types: 4 Cans of beer per week     Comment: 1-2 times weekly    Drug use: Not Currently     Types: Marijuana        Allergies:  Review of patient's allergies indicates:  No Known Allergies     Objective:  Vitals:    05/03/23 1059   BP: 138/71   Pulse: 70   Resp: 19   Temp: 97.7 °F (36.5 °C)     General:  Well developed, well nourished adult male in no acute distress  Abdomen: Soft, nontender, no masses  Extremities:  No clubbing, cyanosis, or edema  Neurologic:  Grossly intact  Musculoskeletal:  Normal tone    Assessment:  1. Prostate cancer  - PSA, Total (Diagnostic); Future     Plan:  PSA is now down significantly.  We will continue to watch the PSA closely with a repeat in three months.    Follow-up:  Three months after PSA.    Addendum:  In reviewing the PET scan after the patient had left I saw that there was possible metastatic disease to the bones that I did not see while he was here.  I am going to review the images with the radiologist and call the patient.  I did call the patient today and explain this to him.      I spent a total of 30 minutes on the day of the visit.This includes face to face time and non-face to face time preparing to see the patient (eg, review of tests), obtaining and/or reviewing separately obtained history, documenting clinical information in the electronic or other health record, independently interpreting results and communicating results to  the patient/family/caregiver, or care coordinator.

## 2023-05-05 ENCOUNTER — TELEPHONE (OUTPATIENT)
Dept: UROLOGY | Facility: CLINIC | Age: 63
End: 2023-05-05
Payer: MEDICAID

## 2023-05-05 DIAGNOSIS — C61 PROSTATE CANCER: Primary | ICD-10-CM

## 2023-05-05 NOTE — TELEPHONE ENCOUNTER
I spoke with Dr. Stewart in Radiology and reviewed the PET scan with him.  He states that the findings are not definitively metastatic prostate cancer and recommends that the iliac lesion be biopsied.  I called the patient and informed him of this.  I have also entered the referral to Interventional Radiology to have this done.  I will see him back after that.

## 2023-05-23 DIAGNOSIS — C61 PROSTATE CANCER: Primary | ICD-10-CM

## 2023-05-29 ENCOUNTER — TELEPHONE (OUTPATIENT)
Dept: INTERVENTIONAL RADIOLOGY/VASCULAR | Facility: HOSPITAL | Age: 63
End: 2023-05-29
Payer: MEDICAID

## 2023-06-02 DIAGNOSIS — C61 PROSTATE CANCER: Primary | ICD-10-CM

## 2023-06-08 ENCOUNTER — ANESTHESIA EVENT (OUTPATIENT)
Dept: INTERVENTIONAL RADIOLOGY/VASCULAR | Facility: HOSPITAL | Age: 63
End: 2023-06-08
Payer: MEDICAID

## 2023-06-08 ENCOUNTER — HOSPITAL ENCOUNTER (OUTPATIENT)
Dept: INTERVENTIONAL RADIOLOGY/VASCULAR | Facility: HOSPITAL | Age: 63
Discharge: HOME | End: 2023-06-08
Attending: UROLOGY
Payer: MEDICAID

## 2023-06-08 VITALS
WEIGHT: 231 LBS | DIASTOLIC BLOOD PRESSURE: 80 MMHG | RESPIRATION RATE: 20 BRPM | SYSTOLIC BLOOD PRESSURE: 140 MMHG | OXYGEN SATURATION: 97 % | BODY MASS INDEX: 32.22 KG/M2 | TEMPERATURE: 98 F | HEART RATE: 78 BPM

## 2023-06-08 DIAGNOSIS — C61 MALIGNANT NEOPLASM OF PROSTATE: ICD-10-CM

## 2023-06-08 DIAGNOSIS — C61 PROSTATE CANCER: ICD-10-CM

## 2023-06-08 PROCEDURE — D9220A PRA ANESTHESIA: Mod: CRNA,,, | Performed by: NURSE ANESTHETIST, CERTIFIED REGISTERED

## 2023-06-08 PROCEDURE — 63600175 PHARM REV CODE 636 W HCPCS

## 2023-06-08 PROCEDURE — D9220A PRA ANESTHESIA: Mod: ANES,,, | Performed by: ANESTHESIOLOGY

## 2023-06-08 PROCEDURE — 99152 MOD SED SAME PHYS/QHP 5/>YRS: CPT | Mod: 59

## 2023-06-08 PROCEDURE — 20220 BONE BIOPSY TROCAR/NDL SUPFC: CPT

## 2023-06-08 PROCEDURE — 27201423 OPTIME MED/SURG SUP & DEVICES STERILE SUPPLY

## 2023-06-08 PROCEDURE — 88307 TISSUE EXAM BY PATHOLOGIST: CPT | Mod: TC | Performed by: UROLOGY

## 2023-06-08 PROCEDURE — 37000009 HC ANESTHESIA EA ADD 15 MINS

## 2023-06-08 PROCEDURE — 25000003 PHARM REV CODE 250: Performed by: NURSE ANESTHETIST, CERTIFIED REGISTERED

## 2023-06-08 PROCEDURE — 37000008 HC ANESTHESIA 1ST 15 MINUTES

## 2023-06-08 PROCEDURE — 88311 DECALCIFY TISSUE: CPT | Mod: TC | Performed by: UROLOGY

## 2023-06-08 PROCEDURE — D9220A PRA ANESTHESIA: ICD-10-PCS | Mod: CRNA,,, | Performed by: NURSE ANESTHETIST, CERTIFIED REGISTERED

## 2023-06-08 PROCEDURE — 63600175 PHARM REV CODE 636 W HCPCS: Performed by: NURSE ANESTHETIST, CERTIFIED REGISTERED

## 2023-06-08 PROCEDURE — 01120 ANES PX ON BONY PELVIS: CPT

## 2023-06-08 PROCEDURE — D9220A PRA ANESTHESIA: ICD-10-PCS | Mod: ANES,,, | Performed by: ANESTHESIOLOGY

## 2023-06-08 PROCEDURE — C1894 INTRO/SHEATH, NON-LASER: HCPCS

## 2023-06-08 PROCEDURE — 25000003 PHARM REV CODE 250: Performed by: RADIOLOGY

## 2023-06-08 RX ORDER — PROPOFOL 10 MG/ML
VIAL (ML) INTRAVENOUS CONTINUOUS PRN
Status: DISCONTINUED | OUTPATIENT
Start: 2023-06-08 | End: 2023-06-09

## 2023-06-08 RX ORDER — FENTANYL CITRATE 50 UG/ML
INJECTION, SOLUTION INTRAMUSCULAR; INTRAVENOUS
Status: DISCONTINUED | OUTPATIENT
Start: 2023-06-08 | End: 2023-06-09

## 2023-06-08 RX ORDER — SODIUM CHLORIDE 0.9 % (FLUSH) 0.9 %
10 SYRINGE (ML) INJECTION
Status: DISCONTINUED | OUTPATIENT
Start: 2023-06-08 | End: 2023-06-09 | Stop reason: HOSPADM

## 2023-06-08 RX ORDER — KETOROLAC TROMETHAMINE 30 MG/ML
INJECTION, SOLUTION INTRAMUSCULAR; INTRAVENOUS
Status: DISCONTINUED | OUTPATIENT
Start: 2023-06-08 | End: 2023-06-09

## 2023-06-08 RX ORDER — DEXAMETHASONE SODIUM PHOSPHATE 4 MG/ML
INJECTION, SOLUTION INTRA-ARTICULAR; INTRALESIONAL; INTRAMUSCULAR; INTRAVENOUS; SOFT TISSUE
Status: DISCONTINUED | OUTPATIENT
Start: 2023-06-08 | End: 2023-06-09

## 2023-06-08 RX ORDER — KETAMINE HCL IN 0.9 % NACL 50 MG/5 ML
SYRINGE (ML) INTRAVENOUS
Status: DISCONTINUED | OUTPATIENT
Start: 2023-06-08 | End: 2023-06-09

## 2023-06-08 RX ORDER — SODIUM CHLORIDE, SODIUM LACTATE, POTASSIUM CHLORIDE, CALCIUM CHLORIDE 600; 310; 30; 20 MG/100ML; MG/100ML; MG/100ML; MG/100ML
INJECTION, SOLUTION INTRAVENOUS CONTINUOUS PRN
Status: DISCONTINUED | OUTPATIENT
Start: 2023-06-08 | End: 2023-06-09

## 2023-06-08 RX ORDER — ONDANSETRON 2 MG/ML
INJECTION INTRAMUSCULAR; INTRAVENOUS
Status: DISCONTINUED | OUTPATIENT
Start: 2023-06-08 | End: 2023-06-09

## 2023-06-08 RX ORDER — LIDOCAINE HYDROCHLORIDE 20 MG/ML
INJECTION INTRAVENOUS
Status: DISCONTINUED | OUTPATIENT
Start: 2023-06-08 | End: 2023-06-09

## 2023-06-08 RX ORDER — LIDOCAINE HYDROCHLORIDE 10 MG/ML
INJECTION INFILTRATION; PERINEURAL
Status: COMPLETED | OUTPATIENT
Start: 2023-06-08 | End: 2023-06-08

## 2023-06-08 RX ADMIN — ONDANSETRON 8 MG: 2 INJECTION INTRAMUSCULAR; INTRAVENOUS at 12:06

## 2023-06-08 RX ADMIN — LIDOCAINE HYDROCHLORIDE 5 ML: 10 INJECTION, SOLUTION INFILTRATION; PERINEURAL at 12:06

## 2023-06-08 RX ADMIN — Medication 25 MG: at 12:06

## 2023-06-08 RX ADMIN — KETOROLAC TROMETHAMINE 30 MG: 30 INJECTION, SOLUTION INTRAMUSCULAR at 12:06

## 2023-06-08 RX ADMIN — FENTANYL CITRATE 50 MCG: 50 INJECTION INTRAMUSCULAR; INTRAVENOUS at 12:06

## 2023-06-08 RX ADMIN — SODIUM CHLORIDE, POTASSIUM CHLORIDE, SODIUM LACTATE AND CALCIUM CHLORIDE: 600; 310; 30; 20 INJECTION, SOLUTION INTRAVENOUS at 12:06

## 2023-06-08 RX ADMIN — Medication 120 MCG/KG/MIN: at 12:06

## 2023-06-08 RX ADMIN — DEXAMETHASONE SODIUM PHOSPHATE 8 MG: 4 INJECTION, SOLUTION INTRA-ARTICULAR; INTRALESIONAL; INTRAMUSCULAR; INTRAVENOUS; SOFT TISSUE at 12:06

## 2023-06-08 RX ADMIN — LIDOCAINE HYDROCHLORIDE 50 MG: 20 INJECTION INTRAVENOUS at 12:06

## 2023-06-08 NOTE — TRANSFER OF CARE
Anesthesia Transfer of Care Note    Patient: Eduin Ortiz    Procedure(s) Performed: * No procedures listed *    Patient location: OPS    Anesthesia Type: general    Transport from OR: Transported from OR on room air with adequate spontaneous ventilation    Post pain: adequate analgesia    Post assessment: no apparent anesthetic complications    Post vital signs: stable    Level of consciousness: awake    Nausea/Vomiting: no nausea/vomiting    Complications: none    Transfer of care protocol was followedComments: Report to Will CORDON      Last vitals:   153/92 91 16 36 t 100 sat

## 2023-06-08 NOTE — DISCHARGE INSTRUCTIONS
`Take it easy for the next 24 hours since you have had anesthesia sedation.      ` Resume home medications unless otherwise instructed by your doctor.     ` Be sure to stay hydrated.      `No bending, straining or or heavy lifting over 15 pounds for 1 week.      `You may remove your bandaid in 24 hours and shower then.      `Notify MD if you are running fever over 100.4, see any reddness or foul smelling discharge from biopsy area.      `Go to your nearest ER or call 911 if you have any difficulty breathing or notice swelling or bleeding in your neck. Thank you for choosing [facility] for your care and it was my pleasure taking care of you.  [phone]

## 2023-06-08 NOTE — DISCHARGE SUMMARY
Radiology Post-Procedure Note    Pre Op Diagnosis: Right Pelvic Lesion    Post Op Diagnosis: As Above    Procedure: Right Pelvic biopsy    Procedure performed by: Angel Luis Sims M.D.    Written Informed Consent Obtained: Yes    Specimen Removed: YES     Estimated Blood Loss: Minimal    Findings:   Standard CT Bone Biopsy    Patient tolerated procedure well.    Angel Luis Sims MD

## 2023-06-08 NOTE — H&P
Radiology History & Physical      SUBJECTIVE:     Chief Complaint: Right Bone Lesion    History of Present Illness:  Eduin Ortiz is a 63 y.o. male who presents for Biopsy  Past Medical History:   Diagnosis Date    Gout, unspecified     HLD (hyperlipidemia)     Hypertension     Obesity, Class I, BMI 30-34.9     Tinea versicolor      Past Surgical History:   Procedure Laterality Date    APPENDECTOMY      CIRCUMCISION      ENDOSCOPY      INNER EAR SURGERY      KNEE CARTILAGE SURGERY      TONSILLECTOMY      TRANSRECTAL BIOPSY OF PROSTATE WITH ULTRASOUND GUIDANCE N/A 5/31/2022    Procedure: BIOPSY, PROSTATE, RECTAL APPROACH, WITH US GUIDANCE;  Surgeon: Colton Herrera MD;  Location: [facility] ENDOSCOPY;  Service: Urology;  Laterality: N/A;       Home Meds:   Prior to Admission medications    Medication Sig Start Date End Date Taking? Authorizing Provider   allopurinoL (ZYLOPRIM) 300 MG tablet Take 1 tablet (300 mg total) by mouth once daily. 12/19/22 6/17/23 Yes Erwin Yo MD   colchicine (COLCRYS) 0.6 mg tablet Take 2 tabs by mouth at start of gout flare followed by 1 tab one hour later 3/8/23  Yes Stella Flores MD   hydroCHLOROthiazide (HYDRODIURIL) 25 MG tablet Take 25 mg by mouth once daily. 1/9/23  Yes Historical Provider   lisinopriL (PRINIVIL,ZESTRIL) 40 MG tablet Take 1 tablet (40 mg total) by mouth once daily. 11/8/22 6/8/23 Yes Erwin Yo MD   atorvastatin (LIPITOR) 40 MG tablet Take 1 tablet (40 mg total) by mouth once daily. 11/8/22 5/7/23  Erwin Yo MD     Anticoagulants/Antiplatelets: no anticoagulation    Allergies: Review of patient's allergies indicates:  No Known Allergies  Sedation History:  no adverse reactions    Review of Systems:   Hematological: no known coagulopathies  Respiratory: no shortness of breath  Cardiovascular: no chest pain  Gastrointestinal: no abdominal pain  Genito-Urinary: no dysuria  Musculoskeletal: negative  Neurological:  no TIA or stroke symptoms         OBJECTIVE:     Vital Signs (Most Recent)  Temp: 98.1 °F (36.7 °C) (06/08/23 1307)  Pulse: 78 (06/08/23 1420)  Resp: 20 (06/08/23 1420)  BP: (!) 140/80 (06/08/23 1420)  SpO2: 97 % (06/08/23 1420)    Physical Exam:  ASA: 2    General: no acute distress  Mental Status: alert and oriented to person, place and time  HEENT: normocephalic, atraumatic  Chest: unlabored breathing  Heart: regular heart rate  Abdomen: nondistended  Extremity: moves all extremities    Laboratory  Lab Results   Component Value Date    INR 0.93 06/08/2023       Lab Results   Component Value Date    WBC 7.66 06/08/2023    HGB 13.6 (L) 06/08/2023    HCT 40.3 (L) 06/08/2023    MCV 86.3 06/08/2023     06/08/2023      Lab Results   Component Value Date     06/08/2023    K 4.4 06/08/2023    CO2 24 06/08/2023    BUN 19.9 06/08/2023    CREATININE 0.99 06/08/2023    CALCIUM 9.9 06/08/2023    ALT 45 06/08/2023    AST 23 06/08/2023    ALBUMIN 4.4 06/08/2023    BILITOT 0.5 06/08/2023    BILIDIR 0.2 02/03/2022       ASSESSMENT/PLAN:     Sedation Plan: Deep  Patient will undergo Bone Biopsy.    Angel Luis Sims MD

## 2023-06-08 NOTE — TRANSFER OF CARE
Anesthesia Transfer of Care Note    Patient: Eduin Ortiz    Procedure(s) Performed: * No procedures listed *    Patient location: OPS    Anesthesia Type: general    Transport from OR: Transported from OR on room air with adequate spontaneous ventilation    Post pain: adequate analgesia    Post assessment: no apparent anesthetic complications    Post vital signs: stable    Level of consciousness: awake    Nausea/Vomiting: no nausea/vomiting    Complications: none    Transfer of care protocol was followedComments: Report to John Velasco      Last vitals:   153/92 p88 r16 t36 sat 1400

## 2023-06-08 NOTE — Clinical Note
A pre-sedation assessment was completed by the anesthesia physician immediately prior to sedation start.

## 2023-06-08 NOTE — ANESTHESIA PREPROCEDURE EVALUATION
06/08/2023  Eduin Ortiz is a 63 y.o., male with HTN and prostate CA for right posterior iliac crest bx      Pre-op Assessment    I have reviewed the Patient Summary Reports.     I have reviewed the Nursing Notes. I have reviewed the NPO Status.   I have reviewed the Medications.     Review of Systems  Anesthesia Hx:  No problems with previous Anesthesia  Denies Family Hx of Anesthesia complications.   Denies Personal Hx of Anesthesia complications.   Hematology/Oncology:  Hematology Normal   Oncology Normal     EENT/Dental:  EENT/Dental Normal  Ears General/Symptom(s) Ear Disease: Tympanic Membrane Problem   Cardiovascular:  Cardiovascular Normal     Pulmonary:  Pulmonary Normal    Renal/:  Renal/ Normal     Hepatic/GI:  Hepatic/GI Normal    Musculoskeletal:  Musculoskeletal Normal    Neurological:  Neurology Normal    Endocrine:  Endocrine Normal    Dermatological:  Skin Normal    Psych:  Psychiatric Normal              Anesthesia Plan  Type of Anesthesia, risks & benefits discussed:    Anesthesia Type: MAC  Intra-op Monitoring Plan: Standard ASA Monitors  Post Op Pain Control Plan: multimodal analgesia and IV/PO Opioids PRN  Induction:  IV  Informed Consent: Informed consent signed with the Patient and all parties understand the risks and agree with anesthesia plan.  All questions answered.   ASA Score: 3  Day of Surgery Review of History & Physical: H&P Update referred to the surgeon/provider.I have interviewed and examined the patient. I have reviewed the patient's H&P dated: There are no significant changes. H&P completed by Anesthesiologist.    Ready For Surgery From Anesthesia Perspective.     .

## 2023-06-08 NOTE — ANESTHESIA POSTPROCEDURE EVALUATION
Anesthesia Post Evaluation    Patient: Eduin Ortiz    Procedure(s) Performed: * No procedures listed *    Final Anesthesia Type: MAC      Patient location during evaluation: med/surg floor  Patient participation: Yes- Able to Participate  Level of consciousness: awake and alert and oriented  Post-procedure vital signs: reviewed and stable  Pain management: adequate  Airway patency: patent    PONV status at discharge: No PONV  Anesthetic complications: no      Cardiovascular status: blood pressure returned to baseline and stable  Respiratory status: unassisted, spontaneous ventilation and room air  Hydration status: euvolemic  Follow-up not needed.  Comments: Patient to bed per self          Vitals Value Taken Time   /97 06/08/23 1258   Temp 36 06/08/23 1258   Pulse 89 06/08/23 1258   Resp 16 06/08/23 1258   SpO2 97 06/08/23 1258         No case tracking events are documented in the log.      Pain/Ja Score: No data recorded

## 2023-06-14 LAB
ESTROGEN SERPL-MCNC: NORMAL PG/ML
INSULIN SERPL-ACNC: NORMAL U[IU]/ML
LAB AP CLINICAL INFORMATION: NORMAL
LAB AP GROSS DESCRIPTION: NORMAL
LAB AP REPORT FOOTNOTES: NORMAL
T3RU NFR SERPL: NORMAL %

## 2023-06-19 DIAGNOSIS — I10 PRIMARY HYPERTENSION: ICD-10-CM

## 2023-06-21 ENCOUNTER — PATIENT MESSAGE (OUTPATIENT)
Dept: ADMINISTRATIVE | Facility: HOSPITAL | Age: 63
End: 2023-06-21
Payer: MEDICAID

## 2023-06-21 RX ORDER — LISINOPRIL 40 MG/1
40 TABLET ORAL DAILY
Qty: 90 TABLET | Refills: 1 | Status: SHIPPED | OUTPATIENT
Start: 2023-06-21 | End: 2023-06-22 | Stop reason: SDUPTHER

## 2023-06-22 ENCOUNTER — OFFICE VISIT (OUTPATIENT)
Dept: FAMILY MEDICINE | Facility: CLINIC | Age: 63
End: 2023-06-22
Payer: MEDICAID

## 2023-06-22 VITALS
DIASTOLIC BLOOD PRESSURE: 75 MMHG | BODY MASS INDEX: 32.9 KG/M2 | HEIGHT: 71 IN | WEIGHT: 235 LBS | RESPIRATION RATE: 18 BRPM | SYSTOLIC BLOOD PRESSURE: 131 MMHG | OXYGEN SATURATION: 100 % | HEART RATE: 76 BPM | TEMPERATURE: 98 F

## 2023-06-22 DIAGNOSIS — M10.9 ACUTE GOUT, UNSPECIFIED CAUSE, UNSPECIFIED SITE: ICD-10-CM

## 2023-06-22 DIAGNOSIS — I10 PRIMARY HYPERTENSION: ICD-10-CM

## 2023-06-22 DIAGNOSIS — E78.5 HYPERLIPIDEMIA, UNSPECIFIED HYPERLIPIDEMIA TYPE: ICD-10-CM

## 2023-06-22 DIAGNOSIS — M10.9 GOUT, UNSPECIFIED CAUSE, UNSPECIFIED CHRONICITY, UNSPECIFIED SITE: ICD-10-CM

## 2023-06-22 PROCEDURE — 99214 OFFICE O/P EST MOD 30 MIN: CPT | Mod: PBBFAC

## 2023-06-22 RX ORDER — ATORVASTATIN CALCIUM 40 MG/1
40 TABLET, FILM COATED ORAL DAILY
Qty: 90 TABLET | Refills: 4 | Status: SHIPPED | OUTPATIENT
Start: 2023-06-22 | End: 2023-08-31 | Stop reason: SDUPTHER

## 2023-06-22 RX ORDER — ALLOPURINOL 300 MG/1
300 TABLET ORAL DAILY
Qty: 90 TABLET | Refills: 4 | Status: SHIPPED | OUTPATIENT
Start: 2023-06-22 | End: 2023-12-19

## 2023-06-22 RX ORDER — HYDROCHLOROTHIAZIDE 25 MG/1
25 TABLET ORAL DAILY
Qty: 90 TABLET | Refills: 4 | Status: SHIPPED | OUTPATIENT
Start: 2023-06-22 | End: 2024-03-21 | Stop reason: SDUPTHER

## 2023-06-22 RX ORDER — LISINOPRIL 40 MG/1
40 TABLET ORAL DAILY
Qty: 90 TABLET | Refills: 4 | Status: SHIPPED | OUTPATIENT
Start: 2023-06-22 | End: 2024-02-14

## 2023-06-22 RX ORDER — COLCHICINE 0.6 MG/1
TABLET ORAL
Qty: 90 TABLET | Refills: 4 | Status: SHIPPED | OUTPATIENT
Start: 2023-06-22

## 2023-06-22 NOTE — PROGRESS NOTES
[facility]      64 y/o male here for routine f/u.      Acute issues/CC:  Medication refills.     Chronic issues:  HTN   -Currently takes HCTZ 25 mg and Lisinopril 40 mg daily  -Denies chest pain, SOB, abdominal pain, N/V, diaphoresis     HLD  -Currently takes Lipitor 40 mg  -Denies muscle cramps      ROS   See above     PE  Vitals:    06/22/23 0942   BP: 131/75   Pulse: 76   Resp: 18   Temp: 98.2 °F (36.8 °C)   General: pleasant and cooperative male in no acute distress  Lungs: Clear to auscultation bilaterally   Heart: RRR       A/P  HTN   -Well controlled  -Refilled medications     HLD  -Refilled medication    At RTC will address care gaps

## 2023-06-22 NOTE — PROGRESS NOTES
I have discussed the case with the resident and reviewed the resident's history and physical, assessment, plan, and progress note. I agree with the findings.       Dm Angulo MD  [facility]

## 2023-07-26 ENCOUNTER — LAB VISIT (OUTPATIENT)
Dept: LAB | Facility: HOSPITAL | Age: 63
End: 2023-07-26
Attending: UROLOGY
Payer: MEDICAID

## 2023-07-26 DIAGNOSIS — C61 PROSTATE CANCER: ICD-10-CM

## 2023-07-26 LAB — PSA SERPL-MCNC: 1.61 NG/ML

## 2023-07-26 PROCEDURE — 84153 ASSAY OF PSA TOTAL: CPT

## 2023-07-26 PROCEDURE — 36415 COLL VENOUS BLD VENIPUNCTURE: CPT

## 2023-08-02 ENCOUNTER — OFFICE VISIT (OUTPATIENT)
Dept: UROLOGY | Facility: CLINIC | Age: 63
End: 2023-08-02
Payer: MEDICAID

## 2023-08-02 VITALS
BODY MASS INDEX: 32.43 KG/M2 | RESPIRATION RATE: 98 BRPM | HEART RATE: 76 BPM | HEIGHT: 71 IN | DIASTOLIC BLOOD PRESSURE: 76 MMHG | TEMPERATURE: 98 F | WEIGHT: 231.63 LBS | SYSTOLIC BLOOD PRESSURE: 127 MMHG

## 2023-08-02 DIAGNOSIS — C61 PROSTATE CANCER: Primary | ICD-10-CM

## 2023-08-02 LAB
BILIRUB SERPL-MCNC: NORMAL MG/DL
BLOOD URINE, POC: NORMAL
COLOR, POC UA: YELLOW
GLUCOSE UR QL STRIP: NORMAL
KETONES UR QL STRIP: NORMAL
LEUKOCYTE ESTERASE URINE, POC: NORMAL
NITRITE, POC UA: NORMAL
PH, POC UA: 6
PROTEIN, POC: NORMAL
SPECIFIC GRAVITY, POC UA: >=1.03
UROBILINOGEN, POC UA: 0.2

## 2023-08-02 PROCEDURE — 3078F DIAST BP <80 MM HG: CPT | Mod: CPTII,,, | Performed by: UROLOGY

## 2023-08-02 PROCEDURE — 1159F PR MEDICATION LIST DOCUMENTED IN MEDICAL RECORD: ICD-10-PCS | Mod: CPTII,,, | Performed by: UROLOGY

## 2023-08-02 PROCEDURE — 1160F PR REVIEW ALL MEDS BY PRESCRIBER/CLIN PHARMACIST DOCUMENTED: ICD-10-PCS | Mod: CPTII,,, | Performed by: UROLOGY

## 2023-08-02 PROCEDURE — 3008F BODY MASS INDEX DOCD: CPT | Mod: CPTII,,, | Performed by: UROLOGY

## 2023-08-02 PROCEDURE — 3008F PR BODY MASS INDEX (BMI) DOCUMENTED: ICD-10-PCS | Mod: CPTII,,, | Performed by: UROLOGY

## 2023-08-02 PROCEDURE — 99214 OFFICE O/P EST MOD 30 MIN: CPT | Mod: PBBFAC | Performed by: UROLOGY

## 2023-08-02 PROCEDURE — 81001 URINALYSIS AUTO W/SCOPE: CPT | Mod: PBBFAC | Performed by: UROLOGY

## 2023-08-02 PROCEDURE — 1160F RVW MEDS BY RX/DR IN RCRD: CPT | Mod: CPTII,,, | Performed by: UROLOGY

## 2023-08-02 PROCEDURE — 1159F MED LIST DOCD IN RCRD: CPT | Mod: CPTII,,, | Performed by: UROLOGY

## 2023-08-02 PROCEDURE — 4010F PR ACE/ARB THEARPY RXD/TAKEN: ICD-10-PCS | Mod: CPTII,,, | Performed by: UROLOGY

## 2023-08-02 PROCEDURE — 3074F PR MOST RECENT SYSTOLIC BLOOD PRESSURE < 130 MM HG: ICD-10-PCS | Mod: CPTII,,, | Performed by: UROLOGY

## 2023-08-02 PROCEDURE — 99213 OFFICE O/P EST LOW 20 MIN: CPT | Mod: S$PBB,,, | Performed by: UROLOGY

## 2023-08-02 PROCEDURE — 3074F SYST BP LT 130 MM HG: CPT | Mod: CPTII,,, | Performed by: UROLOGY

## 2023-08-02 PROCEDURE — 4010F ACE/ARB THERAPY RXD/TAKEN: CPT | Mod: CPTII,,, | Performed by: UROLOGY

## 2023-08-02 PROCEDURE — 99213 PR OFFICE/OUTPT VISIT, EST, LEVL III, 20-29 MIN: ICD-10-PCS | Mod: S$PBB,,, | Performed by: UROLOGY

## 2023-08-02 PROCEDURE — 3078F PR MOST RECENT DIASTOLIC BLOOD PRESSURE < 80 MM HG: ICD-10-PCS | Mod: CPTII,,, | Performed by: UROLOGY

## 2023-08-02 NOTE — PROGRESS NOTES
CC:  Prostate cancer    HPI:  Eduin Ortiz is a 63 y.o. male seen for follow-up of prostate cancer.  The patient had a PSA of 5.82 on 3 February 2022.  He underwent a prostate biopsy on 31 May 2022.  The biopsy showed [place] grade 3+3 in the right and left apex.  There was a question of some bony lesions so these were biopsied and that was negative for cancer.  He elected to have EBRT which he completed in August 2022.  Following radiation his PSA was 4.2 and remained that for four months so I ordered a PET scan which was essentially negative for metastatic disease.  The next PSA started to decrease down to 2.16 in April and the PSA for this visit is down to 1.61.  Today he has no complaints.      Urinalysis:  Results for orders placed or performed in visit on 08/02/23   POCT URINE DIPSTICK WITH MICROSCOPE, AUTOMATED   Result Value Ref Range    Color, UA Yellow     Spec Grav UA >=1.030     pH, UA 6.0     WBC, UA neg     Nitrite, UA neg     Protein, POC neg     Glucose, UA neg     Ketones, UA neg     Urobilinogen, UA 0.2     Bilirubin, POC neg     Blood, UA neg      Microscopic:  Negative      Lab Results:  PSA History:     Latest Reference Range & Units 11/28/22 09:21 03/01/23 09:10 04/25/23 07:01 07/26/23 06:35   PSA, Screen <=4.00 ng/mL 4.20 (H) 4.26 (H) 2.16 1.61     ROS:  All systems reviewed and are negative except as documented in HPI and/or Assessment and Plan.     Patient Active Problem List:     Patient Active Problem List   Diagnosis    Elevated PSA    Hypertension    HLD (hyperlipidemia)    Tinea versicolor    Prostate cancer    Erectile dysfunction        Past Medical History:  Past Medical History:   Diagnosis Date    Gout, unspecified     HLD (hyperlipidemia)     Hypertension     Obesity, Class I, BMI 30-34.9     Tinea versicolor         Past Surgical History:  Past Surgical History:   Procedure Laterality Date    APPENDECTOMY      CIRCUMCISION      ENDOSCOPY      INNER EAR SURGERY      KNEE CARTILAGE  SURGERY      TONSILLECTOMY      TRANSRECTAL BIOPSY OF PROSTATE WITH ULTRASOUND GUIDANCE N/A 5/31/2022    Procedure: BIOPSY, PROSTATE, RECTAL APPROACH, WITH US GUIDANCE;  Surgeon: Colton Herrera MD;  Location: [facility] ENDOSCOPY;  Service: Urology;  Laterality: N/A;        Family History:  Family History   Problem Relation Age of Onset    Lung cancer Mother     Liver cancer Father     Drug abuse Sister     Lung cancer Brother         Social History:  Social History     Socioeconomic History    Marital status:    Tobacco Use    Smoking status: Never     Passive exposure: Never    Smokeless tobacco: Never   Substance and Sexual Activity    Alcohol use: Yes     Alcohol/week: 4.0 standard drinks of alcohol     Types: 4 Cans of beer per week     Comment: 1-2 times weekly    Drug use: Not Currently     Types: Marijuana        Allergies:  Review of patient's allergies indicates:  No Known Allergies     Objective:  Vitals:    08/02/23 1130   BP: 127/76   Pulse: 76   Resp: (!) 98   Temp: 98.1 °F (36.7 °C)     General:  Well developed, well nourished adult male in no acute distress  Abdomen: Soft, nontender, no masses  Extremities:  No clubbing, cyanosis, or edema  Neurologic:  Grossly intact  Musculoskeletal:  Normal tone    Assessment:  1. Prostate cancer  - POCT URINE DIPSTICK WITH MICROSCOPE, AUTOMATED  - PSA, Total (Diagnostic); Future     Plan:  PSA has started to decrease.  We will continue to follow this with a repeat PSA in three months.    Follow-up:  PSA in three months and follow-up after.

## 2023-08-05 NOTE — TRANSFER OF CARE
"Anesthesia Transfer of Care Note    Patient: Eduin Ortiz    Procedure(s) Performed: Procedure(s) (LRB):  BIOPSY, PROSTATE, RECTAL APPROACH, WITH US GUIDANCE (N/A)    Patient location: GI    Anesthesia Type: general    Transport from OR: Transported from OR on room air with adequate spontaneous ventilation    Post pain: adequate analgesia    Post assessment: no apparent anesthetic complications    Post vital signs: stable    Level of consciousness: awake and sedated    Nausea/Vomiting: no nausea/vomiting    Complications: none    Transfer of care protocol was followed      Last vitals:   Visit Vitals  BP (!) 152/97 (BP Location: Left arm, Patient Position: Lying)   Pulse 77   Temp 36.7 °C (98.1 °F) (Oral)   Resp 20   Ht 5' 11" (1.803 m)   Wt 91.2 kg (201 lb)   SpO2 99%   BMI 28.03 kg/m²     " Pt arrives to ED c/o sore throat for three days . Dense fevers

## 2023-08-31 DIAGNOSIS — E78.5 HYPERLIPIDEMIA, UNSPECIFIED HYPERLIPIDEMIA TYPE: ICD-10-CM

## 2023-08-31 RX ORDER — ATORVASTATIN CALCIUM 40 MG/1
40 TABLET, FILM COATED ORAL DAILY
Qty: 90 TABLET | Refills: 3 | Status: SHIPPED | OUTPATIENT
Start: 2023-08-31

## 2023-10-23 ENCOUNTER — OFFICE VISIT (OUTPATIENT)
Dept: FAMILY MEDICINE | Facility: CLINIC | Age: 63
End: 2023-10-23
Payer: MEDICAID

## 2023-10-23 VITALS
OXYGEN SATURATION: 99 % | BODY MASS INDEX: 32.93 KG/M2 | SYSTOLIC BLOOD PRESSURE: 151 MMHG | HEIGHT: 71 IN | TEMPERATURE: 98 F | DIASTOLIC BLOOD PRESSURE: 84 MMHG | HEART RATE: 66 BPM | WEIGHT: 235.19 LBS

## 2023-10-23 DIAGNOSIS — E78.5 HYPERLIPIDEMIA, UNSPECIFIED HYPERLIPIDEMIA TYPE: ICD-10-CM

## 2023-10-23 DIAGNOSIS — I10 PRIMARY HYPERTENSION: ICD-10-CM

## 2023-10-23 DIAGNOSIS — M12.811 ROTATOR CUFF ARTHROPATHY OF BOTH SHOULDERS: Primary | ICD-10-CM

## 2023-10-23 DIAGNOSIS — M12.812 ROTATOR CUFF ARTHROPATHY OF BOTH SHOULDERS: Primary | ICD-10-CM

## 2023-10-23 PROCEDURE — 99214 OFFICE O/P EST MOD 30 MIN: CPT | Mod: PBBFAC | Performed by: STUDENT IN AN ORGANIZED HEALTH CARE EDUCATION/TRAINING PROGRAM

## 2023-10-23 NOTE — PROGRESS NOTES
[facility]      62 y/o male here for routine f/u.      Acute issues/CC:  Bilateral shoulder pain-lateral shoulder pain, intermittent, throbbing, worse with lateral and overhead lifting.  Not currently bothering.  Reports no prior history of injury had shoulder issues as played sports at younger age.  Once an outside urgent care roughly 3 weeks ago and received Toradol injection with 5 days Toradol b.i.d. which worked well.  No prior history of PT or surgery.    Chronic issues:  HTN   -Currently takes HCTZ 25 mg and Lisinopril 40 mg daily  -Denies chest pain, SOB, abdominal pain, N/V, diaphoresis     HLD  -Currently takes Lipitor 40 mg  -Denies muscle cramps      ROS   See above     PE  Vitals:    10/23/23 1049   BP: (!) 151/84   Pulse:    Temp:    General: pleasant and cooperative male in no acute distress  Lungs: Clear to auscultation bilaterally   Heart: RRR       A/P    Rotator cuff arthropathy, bilaterally   - chronic, stable   - range of motion and strengthening exercises brought the patient for physical activity    HTN   -BP elevated   -meds reviewed, continue current regimen   -avoid NSAIDs has been increase blood pressure    HLD  - controlled  -Refilled medication    HM:  Colonoscopy- reports every 5 years with Dr. Medrano, records requested

## 2023-11-09 ENCOUNTER — LAB VISIT (OUTPATIENT)
Dept: LAB | Facility: HOSPITAL | Age: 63
End: 2023-11-09
Attending: UROLOGY
Payer: MEDICAID

## 2023-11-09 DIAGNOSIS — C61 PROSTATE CANCER: ICD-10-CM

## 2023-11-09 LAB — PSA SERPL-MCNC: 1.33 NG/ML

## 2023-11-09 PROCEDURE — 84153 ASSAY OF PSA TOTAL: CPT

## 2023-11-09 PROCEDURE — 36415 COLL VENOUS BLD VENIPUNCTURE: CPT

## 2023-11-13 ENCOUNTER — OFFICE VISIT (OUTPATIENT)
Dept: UROLOGY | Facility: CLINIC | Age: 63
End: 2023-11-13
Payer: MEDICAID

## 2023-11-13 VITALS
SYSTOLIC BLOOD PRESSURE: 149 MMHG | DIASTOLIC BLOOD PRESSURE: 79 MMHG | TEMPERATURE: 98 F | WEIGHT: 237 LBS | HEART RATE: 75 BPM | OXYGEN SATURATION: 99 % | RESPIRATION RATE: 20 BRPM | HEIGHT: 71 IN | BODY MASS INDEX: 33.18 KG/M2

## 2023-11-13 DIAGNOSIS — C61 PROSTATE CANCER: Primary | ICD-10-CM

## 2023-11-13 LAB
BILIRUB SERPL-MCNC: NEGATIVE MG/DL
BLOOD URINE, POC: NEGATIVE
COLOR, POC UA: YELLOW
GLUCOSE UR QL STRIP: NEGATIVE
KETONES UR QL STRIP: NEGATIVE
LEUKOCYTE ESTERASE URINE, POC: NEGATIVE
NITRITE, POC UA: NEGATIVE
PH, POC UA: 6
PROTEIN, POC: NEGATIVE
SPECIFIC GRAVITY, POC UA: 1.02
UROBILINOGEN, POC UA: 0.2

## 2023-11-13 PROCEDURE — 3008F BODY MASS INDEX DOCD: CPT | Mod: CPTII,,, | Performed by: UROLOGY

## 2023-11-13 PROCEDURE — 99213 PR OFFICE/OUTPT VISIT, EST, LEVL III, 20-29 MIN: ICD-10-PCS | Mod: S$PBB,,, | Performed by: UROLOGY

## 2023-11-13 PROCEDURE — 1159F PR MEDICATION LIST DOCUMENTED IN MEDICAL RECORD: ICD-10-PCS | Mod: CPTII,,, | Performed by: UROLOGY

## 2023-11-13 PROCEDURE — 1159F MED LIST DOCD IN RCRD: CPT | Mod: CPTII,,, | Performed by: UROLOGY

## 2023-11-13 PROCEDURE — 3008F PR BODY MASS INDEX (BMI) DOCUMENTED: ICD-10-PCS | Mod: CPTII,,, | Performed by: UROLOGY

## 2023-11-13 PROCEDURE — 3078F PR MOST RECENT DIASTOLIC BLOOD PRESSURE < 80 MM HG: ICD-10-PCS | Mod: CPTII,,, | Performed by: UROLOGY

## 2023-11-13 PROCEDURE — 3077F PR MOST RECENT SYSTOLIC BLOOD PRESSURE >= 140 MM HG: ICD-10-PCS | Mod: CPTII,,, | Performed by: UROLOGY

## 2023-11-13 PROCEDURE — 3078F DIAST BP <80 MM HG: CPT | Mod: CPTII,,, | Performed by: UROLOGY

## 2023-11-13 PROCEDURE — 1160F RVW MEDS BY RX/DR IN RCRD: CPT | Mod: CPTII,,, | Performed by: UROLOGY

## 2023-11-13 PROCEDURE — 99213 OFFICE O/P EST LOW 20 MIN: CPT | Mod: S$PBB,,, | Performed by: UROLOGY

## 2023-11-13 PROCEDURE — 81001 URINALYSIS AUTO W/SCOPE: CPT | Mod: PBBFAC | Performed by: UROLOGY

## 2023-11-13 PROCEDURE — 4010F ACE/ARB THERAPY RXD/TAKEN: CPT | Mod: CPTII,,, | Performed by: UROLOGY

## 2023-11-13 PROCEDURE — 1160F PR REVIEW ALL MEDS BY PRESCRIBER/CLIN PHARMACIST DOCUMENTED: ICD-10-PCS | Mod: CPTII,,, | Performed by: UROLOGY

## 2023-11-13 PROCEDURE — 4010F PR ACE/ARB THEARPY RXD/TAKEN: ICD-10-PCS | Mod: CPTII,,, | Performed by: UROLOGY

## 2023-11-13 PROCEDURE — 3077F SYST BP >= 140 MM HG: CPT | Mod: CPTII,,, | Performed by: UROLOGY

## 2023-11-13 PROCEDURE — 99214 OFFICE O/P EST MOD 30 MIN: CPT | Mod: PBBFAC | Performed by: UROLOGY

## 2023-11-13 RX ORDER — DICLOFENAC SODIUM 75 MG/1
75 TABLET, DELAYED RELEASE ORAL 2 TIMES DAILY
COMMUNITY

## 2023-11-13 NOTE — PROGRESS NOTES
CC:  Prostate cancer    HPI:  Eduin Ortiz is a 63 y.o. male seen for follow-up of prostate cancer.   The patient had a PSA of 5.82 on 3 February 2022.  He underwent a prostate biopsy on 31 May 2022.  The biopsy showed [place] grade 3+3 in the right and left apex.  There was a question of some bony lesions so these were biopsied and that was negative for cancer.  He elected to have EBRT which he completed in August 2022.  Following radiation his PSA was 4.2 and remained that for four months so I ordered a PET scan which was essentially negative for metastatic disease.  The next PSA started to decrease and it has been decreasing since then.  He has no urinary complaints.    Urinalysis:  Results for orders placed or performed in visit on 11/13/23   POCT URINE DIPSTICK WITH MICROSCOPE, AUTOMATED   Result Value Ref Range    Color, UA Yellow     Spec Grav UA 1.025     pH, UA 6.0     WBC, UA Negative     Nitrite, UA Negative     Protein, POC Negative     Glucose, UA Negative     Ketones, UA Negative     Urobilinogen, UA 0.2     Bilirubin, POC Negative     Blood, UA Negative      Microscopic:  Negative      Lab Results:  PSA History:    02/04/21 09:30 02/09/21 10:00 02/03/22 11:01 11/28/22 09:21 03/01/23 09:10 04/25/23 07:01 07/26/23 06:35 11/09/23 08:21   5.38 (H) 4.59 (H) 5.82 4.20 (H) 4.26 (H) 2.16 1.61 1.33       ROS:  All systems reviewed and are negative except as documented in HPI and/or Assessment and Plan.     Patient Active Problem List:     Patient Active Problem List   Diagnosis    Elevated PSA    Hypertension    HLD (hyperlipidemia)    Tinea versicolor    Prostate cancer    Erectile dysfunction        Past Medical History:  Past Medical History:   Diagnosis Date    Gout, unspecified     HLD (hyperlipidemia)     Hypertension     Obesity, Class I, BMI 30-34.9     Tinea versicolor         Past Surgical History:  Past Surgical History:   Procedure Laterality Date    APPENDECTOMY      CIRCUMCISION      ENDOSCOPY       INNER EAR SURGERY      KNEE CARTILAGE SURGERY      TONSILLECTOMY      TRANSRECTAL BIOPSY OF PROSTATE WITH ULTRASOUND GUIDANCE N/A 5/31/2022    Procedure: BIOPSY, PROSTATE, RECTAL APPROACH, WITH US GUIDANCE;  Surgeon: Colton Herrera MD;  Location: [facility] ENDOSCOPY;  Service: Urology;  Laterality: N/A;        Family History:  Family History   Problem Relation Age of Onset    Lung cancer Mother     Liver cancer Father     Drug abuse Sister     Lung cancer Brother         Social History:  Social History     Socioeconomic History    Marital status:    Tobacco Use    Smoking status: Never     Passive exposure: Never    Smokeless tobacco: Never   Substance and Sexual Activity    Alcohol use: Yes     Alcohol/week: 4.0 standard drinks of alcohol     Types: 4 Cans of beer per week     Comment: 1-2 times weekly    Drug use: Not Currently     Types: Marijuana        Allergies:  Review of patient's allergies indicates:  No Known Allergies     Objective:  Vitals:    11/13/23 0812   BP: (!) 149/79   Pulse: 75   Resp: 20   Temp: 98.3 °F (36.8 °C)     General:  Well developed, well nourished adult male in no acute distress  Abdomen: Soft, nontender, no masses  Extremities:  No clubbing, cyanosis, or edema  Neurologic:  Grossly intact  Musculoskeletal:  Normal tone      Assessment:  1. Prostate cancer  - POCT URINE DIPSTICK WITH MICROSCOPE, AUTOMATED  - PSA, Total (Diagnostic); Future     Plan:  The PSA has continued to decrease.  We will continue to follow this with a repeat PSA in three months.    Follow-up:  PSA in three months and follow-up after.

## 2024-01-23 ENCOUNTER — OFFICE VISIT (OUTPATIENT)
Dept: FAMILY MEDICINE | Facility: CLINIC | Age: 64
End: 2024-01-23
Payer: MEDICAID

## 2024-01-23 ENCOUNTER — TELEPHONE (OUTPATIENT)
Dept: FAMILY MEDICINE | Facility: CLINIC | Age: 64
End: 2024-01-23
Payer: MEDICAID

## 2024-01-23 VITALS
BODY MASS INDEX: 32.43 KG/M2 | HEART RATE: 93 BPM | DIASTOLIC BLOOD PRESSURE: 69 MMHG | OXYGEN SATURATION: 98 % | RESPIRATION RATE: 20 BRPM | HEIGHT: 71 IN | TEMPERATURE: 98 F | WEIGHT: 231.63 LBS | SYSTOLIC BLOOD PRESSURE: 110 MMHG

## 2024-01-23 DIAGNOSIS — J06.9 UPPER RESPIRATORY TRACT INFECTION, UNSPECIFIED TYPE: Primary | ICD-10-CM

## 2024-01-23 LAB
FLUAV AG UPPER RESP QL IA.RAPID: NOT DETECTED
FLUBV AG UPPER RESP QL IA.RAPID: NOT DETECTED
SARS-COV-2 RNA RESP QL NAA+PROBE: DETECTED

## 2024-01-23 PROCEDURE — 0240U COVID/FLU A&B PCR: CPT | Performed by: STUDENT IN AN ORGANIZED HEALTH CARE EDUCATION/TRAINING PROGRAM

## 2024-01-23 PROCEDURE — 99214 OFFICE O/P EST MOD 30 MIN: CPT | Mod: PBBFAC | Performed by: STUDENT IN AN ORGANIZED HEALTH CARE EDUCATION/TRAINING PROGRAM

## 2024-01-23 RX ORDER — ALLOPURINOL 300 MG/1
300 TABLET ORAL DAILY
COMMUNITY
Start: 2023-12-29

## 2024-01-23 NOTE — PROGRESS NOTES
"[facility] Progress Note    ID:  Eduin Ortiz   MRN:  [id number]     1/23/2024    Chief Complaint:  Upper respiratory symptoms    History of Present Illness:  Eduin Ortiz is a 63 y.o. male who presents to [facility] clinic for evaluation of headaches, fevers T-max 101.8, chills, body aches, sore throat onset evening of 01/18/2024.  Known COVID exposure from spouse.  Positive home COVID test.  Denies any shortness a breath, chest pain, palpitations, wheezing, or cough.      Current Outpatient Medications:     allopurinoL (ZYLOPRIM) 300 MG tablet, Take 300 mg by mouth once daily., Disp: , Rfl:     atorvastatin (LIPITOR) 40 MG tablet, Take 1 tablet (40 mg total) by mouth once daily., Disp: 90 tablet, Rfl: 3    colchicine (COLCRYS) 0.6 mg tablet, Take 2 tabs by mouth at start of gout flare followed by 1 tab one hour later, Disp: 90 tablet, Rfl: 4    diclofenac (VOLTAREN) 75 MG EC tablet, Take 75 mg by mouth 2 (two) times daily., Disp: , Rfl:     hydroCHLOROthiazide (HYDRODIURIL) 25 MG tablet, Take 1 tablet (25 mg total) by mouth once daily., Disp: 90 tablet, Rfl: 4    lisinopriL (PRINIVIL,ZESTRIL) 40 MG tablet, Take 1 tablet (40 mg total) by mouth once daily., Disp: 90 tablet, Rfl: 4    Review of patient's allergies indicates:  No Known Allergies      Review of Systems:  See HPI      Physical Exam:  /69 (BP Location: Right arm, Patient Position: Sitting, BP Method: X-Large (Automatic))   Pulse 93   Temp 97.9 °F (36.6 °C) (Oral)   Resp 20   Ht 5' 11" (1.803 m)   Wt 105.1 kg (231 lb 9.6 oz)   SpO2 98%   BMI 32.30 kg/m²     Gen-well-appearing, sitting comfortably in exam chair, NAD  HEENT-eyeglasses in place, nonicteric sclera.  Nares patent without rhinorrhea or.  Oral mucous membranes are moist, mild oropharyngeal erythema.  Tonsils are absent.  Cervical chain lymphadenopathy is present.  No palpable thyromegaly or nodules.  CV-regular rate and rhythm, no MRG, no swelling  Resp-speaking in full sentences, " breathing is nonlabored, lungs are clear bilaterally  Neuro-alert  Skin-dry skin scalp, flaking bilateral temporal area    Assessment/Plan:    Upper respiratory infection, viral   -Likely COVID in setting of recent exposure and positive home test   -Flu and COVID swab performed today, we will call with results   -Offered Paxlovid, discussed potential risks, benefits, side effects.  Patient with informed decline  -Discussed adequate hydration, over-the-counter antipyretic medication, ED precautions were discussed in depth   -CDC recommendations for mask use and return to work discussed with the patient   -Work excuse provided to patient upon discharge     Eagle Aleman MD  [employer]  Resident HO3

## 2024-01-23 NOTE — LETTER
January 23, 2024      [facility] [address] [place] [address]  Phone: [phone]       Patient: Eduin Ortiz   YOB: 1960  Date of Visit: 01/23/2024    To Whom It May Concern:    Eduin Use  was at [facility] on 01/23/2024. The patient may return to work/school on 1/29/24 with no restrictions. If you have any questions or concerns, or if I can be of further assistance, please do not hesitate to contact me.    Sincerely,    Panchito Funez LPN

## 2024-01-23 NOTE — LETTER
January 23, 2024      [facility] [address] [place] [address]  Phone: [phone]       Patient: Eduin Ortiz   YOB: 1960  Date of Visit: 01/23/2024    To Whom It May Concern:    Eduin Use  was at [facility] on 01/23/2024. The patient may return to work/school on 01/09/2024 with no restrictions. If you have any questions or concerns, or if I can be of further assistance, please do not hesitate to contact me.    Sincerely,    Eagle Aleman MD

## 2024-02-05 NOTE — PROGRESS NOTES
Faculty addendum: Patient discussed with resident. Chart was reviewed including vitals, labs, etc. Care provided reasonable and necessary. I participated in the management of the patient and was immediately available throughout the encounter. Services were furnished in a primary care center located in the outpatient department of a [facility] hospital. I agree with the resident's findings and plan as documented in the resident's note.

## 2024-02-06 ENCOUNTER — LAB VISIT (OUTPATIENT)
Dept: LAB | Facility: HOSPITAL | Age: 64
End: 2024-02-06
Attending: UROLOGY
Payer: MEDICAID

## 2024-02-06 DIAGNOSIS — C61 PROSTATE CANCER: ICD-10-CM

## 2024-02-06 LAB — PSA SERPL-MCNC: 1.35 NG/ML

## 2024-02-06 PROCEDURE — 84153 ASSAY OF PSA TOTAL: CPT

## 2024-02-06 PROCEDURE — 36415 COLL VENOUS BLD VENIPUNCTURE: CPT

## 2024-02-11 DIAGNOSIS — I10 PRIMARY HYPERTENSION: ICD-10-CM

## 2024-02-12 ENCOUNTER — OFFICE VISIT (OUTPATIENT)
Dept: UROLOGY | Facility: CLINIC | Age: 64
End: 2024-02-12
Payer: MEDICAID

## 2024-02-12 VITALS
HEIGHT: 71 IN | DIASTOLIC BLOOD PRESSURE: 81 MMHG | BODY MASS INDEX: 33.01 KG/M2 | WEIGHT: 235.81 LBS | SYSTOLIC BLOOD PRESSURE: 144 MMHG | RESPIRATION RATE: 20 BRPM | OXYGEN SATURATION: 98 % | TEMPERATURE: 98 F | HEART RATE: 75 BPM

## 2024-02-12 DIAGNOSIS — C61 PROSTATE CANCER: Primary | ICD-10-CM

## 2024-02-12 DIAGNOSIS — N52.35 ERECTILE DYSFUNCTION FOLLOWING RADIATION THERAPY: ICD-10-CM

## 2024-02-12 LAB
BILIRUB SERPL-MCNC: NORMAL MG/DL
BLOOD URINE, POC: NORMAL
COLOR, POC UA: YELLOW
GLUCOSE UR QL STRIP: NORMAL
KETONES UR QL STRIP: NORMAL
LEUKOCYTE ESTERASE URINE, POC: NORMAL
NITRITE, POC UA: NORMAL
PH, POC UA: 6
PROTEIN, POC: NORMAL
SPECIFIC GRAVITY, POC UA: 1.02
UROBILINOGEN, POC UA: 0.2

## 2024-02-12 PROCEDURE — 3079F DIAST BP 80-89 MM HG: CPT | Mod: CPTII,,, | Performed by: UROLOGY

## 2024-02-12 PROCEDURE — 3077F SYST BP >= 140 MM HG: CPT | Mod: CPTII,,, | Performed by: UROLOGY

## 2024-02-12 PROCEDURE — 99214 OFFICE O/P EST MOD 30 MIN: CPT | Mod: PBBFAC | Performed by: UROLOGY

## 2024-02-12 PROCEDURE — 1159F MED LIST DOCD IN RCRD: CPT | Mod: CPTII,,, | Performed by: UROLOGY

## 2024-02-12 PROCEDURE — 81001 URINALYSIS AUTO W/SCOPE: CPT | Mod: PBBFAC | Performed by: UROLOGY

## 2024-02-12 PROCEDURE — 3008F BODY MASS INDEX DOCD: CPT | Mod: CPTII,,, | Performed by: UROLOGY

## 2024-02-12 PROCEDURE — 99214 OFFICE O/P EST MOD 30 MIN: CPT | Mod: S$PBB,,, | Performed by: UROLOGY

## 2024-02-12 PROCEDURE — 1160F RVW MEDS BY RX/DR IN RCRD: CPT | Mod: CPTII,,, | Performed by: UROLOGY

## 2024-02-12 RX ORDER — TADALAFIL 20 MG/1
20 TABLET ORAL DAILY PRN
Qty: 30 TABLET | Refills: 11 | Status: SHIPPED | OUTPATIENT
Start: 2024-02-12 | End: 2025-02-11

## 2024-02-12 NOTE — PROGRESS NOTES
CC:  Prostate cancer    HPI:  Eduin Ortiz is a 63 y.o. male seen for follow-up of prostate cancer.   The patient had a PSA of 5.82 on 3 February 2022.  He underwent a prostate biopsy on 31 May 2022.  The biopsy showed [place] grade 3+3 in the right and left apex.  There was a question of some bony lesions so these were biopsied and that was negative for cancer.  He elected to have EBRT which he completed in August 2022.  Following radiation his PSA was 4.2 and remained that for four months so I ordered a PSMA PET scan which was essentially negative for metastatic disease.  The next PSA started to decrease and it has been decreasing since then.  He has no urinary complaints.  He complains of erectile dysfunction.  He has never had any treatment for this.    Urinalysis:  Results for orders placed or performed in visit on 02/12/24   POCT URINE DIPSTICK WITH MICROSCOPE, AUTOMATED   Result Value Ref Range    Color, UA Yellow     Spec Grav UA 1.025     pH, UA 6.0     WBC, UA neg     Nitrite, UA neg     Protein, POC neg     Glucose, UA neg     Ketones, UA neg     Urobilinogen, UA 0.2     Bilirubin, POC neg     Blood, UA neg      Microscopic Urinalysis:  WBC:   Neg per HPF     RBC:    Neg per HPF     Bacteria:    Neg per HPF     Squamous epithelial cells:    Neg per HPF      Crystals:   None    Lab Results:  PSA History:    02/04/21 09:30 02/09/21 10:00 02/03/22 11:01 11/28/22 09:21 03/01/23 09:10 04/25/23 07:01 07/26/23 06:35 11/09/23 08:21 02/06/24 08:56   5.38 (H) 4.59 (H) 5.82 4.20 (H) 4.26 (H) 2.16 1.61 1.33 1.35     ROS:  All systems reviewed and are negative except as documented in HPI and/or Assessment and Plan.     Patient Active Problem List:     Patient Active Problem List   Diagnosis    Elevated PSA    Hypertension    HLD (hyperlipidemia)    Tinea versicolor    Prostate cancer    Erectile dysfunction        Past Medical History:  Past Medical History:   Diagnosis Date    Gout, unspecified     HLD  (hyperlipidemia)     Hypertension     Obesity, Class I, BMI 30-34.9     Tinea versicolor         Past Surgical History:  Past Surgical History:   Procedure Laterality Date    APPENDECTOMY      CIRCUMCISION      ENDOSCOPY      INNER EAR SURGERY      KNEE CARTILAGE SURGERY      TONSILLECTOMY      TRANSRECTAL BIOPSY OF PROSTATE WITH ULTRASOUND GUIDANCE N/A 5/31/2022    Procedure: BIOPSY, PROSTATE, RECTAL APPROACH, WITH US GUIDANCE;  Surgeon: Colton Herrera MD;  Location: [facility] ENDOSCOPY;  Service: Urology;  Laterality: N/A;        Family History:  Family History   Problem Relation Age of Onset    Lung cancer Mother     Liver cancer Father     Drug abuse Sister     Lung cancer Brother         Social History:  Social History     Socioeconomic History    Marital status:    Tobacco Use    Smoking status: Never     Passive exposure: Never    Smokeless tobacco: Never   Substance and Sexual Activity    Alcohol use: Yes     Alcohol/week: 4.0 standard drinks of alcohol     Types: 4 Cans of beer per week     Comment: 1-2 times weekly    Drug use: Not Currently     Types: Marijuana     Social Determinants of Health     Food Insecurity: No Food Insecurity (1/23/2024)    Hunger Vital Sign     Worried About Running Out of Food in the Last Year: Never true     Ran Out of Food in the Last Year: Never true        Allergies:  Review of patient's allergies indicates:  No Known Allergies     Objective:  Vitals:    02/12/24 0902   BP: (!) 144/81   Pulse:    Resp:    Temp:      General:  Well developed, well nourished adult male in no acute distress  Abdomen: Soft, nontender, no masses  Extremities:  No clubbing, cyanosis, or edema  Neurologic:  Grossly intact  Musculoskeletal:  Normal tone     Assessment:  1. Prostate cancer  - POCT URINE DIPSTICK WITH MICROSCOPE, AUTOMATED  - PSA, Total (Diagnostic); Future    2. Erectile dysfunction following radiation therapy  - POCT URINE DIPSTICK WITH MICROSCOPE, AUTOMATED  - tadalafiL  (CIALIS) 20 MG Tab; Take 1 tablet (20 mg total) by mouth daily as needed (prior to sexual activity).  Dispense: 30 tablet; Refill: 11     Plan:  Continue to watch the PSA in three months to make sure it continues to be stable.    I gave him a prescription of Tadalafil with instructions on use.      Follow-up:  PSA in three months and follow-up after.

## 2024-02-14 RX ORDER — LISINOPRIL 40 MG/1
40 TABLET ORAL
Qty: 90 TABLET | Refills: 4 | Status: SHIPPED | OUTPATIENT
Start: 2024-02-14

## 2024-03-21 ENCOUNTER — OFFICE VISIT (OUTPATIENT)
Dept: FAMILY MEDICINE | Facility: CLINIC | Age: 64
End: 2024-03-21
Payer: MEDICAID

## 2024-03-21 ENCOUNTER — HOSPITAL ENCOUNTER (OUTPATIENT)
Dept: RADIOLOGY | Facility: HOSPITAL | Age: 64
Discharge: HOME | End: 2024-03-21
Attending: STUDENT IN AN ORGANIZED HEALTH CARE EDUCATION/TRAINING PROGRAM
Payer: MEDICAID

## 2024-03-21 VITALS
OXYGEN SATURATION: 100 % | HEIGHT: 71 IN | WEIGHT: 232 LBS | TEMPERATURE: 98 F | BODY MASS INDEX: 32.48 KG/M2 | DIASTOLIC BLOOD PRESSURE: 77 MMHG | HEART RATE: 68 BPM | SYSTOLIC BLOOD PRESSURE: 125 MMHG

## 2024-03-21 DIAGNOSIS — M25.512 ACUTE PAIN OF LEFT SHOULDER: ICD-10-CM

## 2024-03-21 DIAGNOSIS — E78.5 HYPERLIPIDEMIA, UNSPECIFIED HYPERLIPIDEMIA TYPE: Primary | ICD-10-CM

## 2024-03-21 LAB
CHOLEST SERPL-MCNC: 272 MG/DL
CHOLEST/HDLC SERPL: 6 {RATIO} (ref 0–5)
HDLC SERPL-MCNC: 45 MG/DL (ref 35–60)
LDLC SERPL CALC-MCNC: 163 MG/DL (ref 50–140)
TRIGL SERPL-MCNC: 319 MG/DL (ref 34–140)
VLDLC SERPL CALC-MCNC: 64 MG/DL

## 2024-03-21 PROCEDURE — 73030 X-RAY EXAM OF SHOULDER: CPT | Mod: TC,LT

## 2024-03-21 PROCEDURE — 99215 OFFICE O/P EST HI 40 MIN: CPT | Mod: PBBFAC,25 | Performed by: STUDENT IN AN ORGANIZED HEALTH CARE EDUCATION/TRAINING PROGRAM

## 2024-03-21 PROCEDURE — 36415 COLL VENOUS BLD VENIPUNCTURE: CPT | Performed by: STUDENT IN AN ORGANIZED HEALTH CARE EDUCATION/TRAINING PROGRAM

## 2024-03-21 PROCEDURE — 80061 LIPID PANEL: CPT | Performed by: STUDENT IN AN ORGANIZED HEALTH CARE EDUCATION/TRAINING PROGRAM

## 2024-03-21 RX ORDER — HYDROCHLOROTHIAZIDE 25 MG/1
25 TABLET ORAL DAILY
Qty: 90 TABLET | Refills: 1 | Status: SHIPPED | OUTPATIENT
Start: 2024-03-21 | End: 2024-09-17

## 2024-03-21 NOTE — PROGRESS NOTES
"[facility] Progress Note    ID:  Eduin Ortiz   MRN:  [id number]     3/21/2024    Chief Complaint:  Left shoulder pain    History of Present Illness:  Eduin Ortiz is a 64 y.o. male who presents to [facility] clinic for left shoulder pain, 2 month history, reports soreness after overhead lifting.  Pain is throbbing, lateral shoulder, worse with overhead movement and lateral arm elevation.  Denies prior injury, imaging, therapy.  Taking Tylenol Arthritis as needed 1-2 times per day.  Denies any and weakness, numbness or tingling or neck pain.     Current Outpatient Medications:     allopurinoL (ZYLOPRIM) 300 MG tablet, Take 300 mg by mouth once daily., Disp: , Rfl:     atorvastatin (LIPITOR) 40 MG tablet, Take 1 tablet (40 mg total) by mouth once daily. (Patient not taking: Reported on 2/12/2024), Disp: 90 tablet, Rfl: 3    colchicine (COLCRYS) 0.6 mg tablet, Take 2 tabs by mouth at start of gout flare followed by 1 tab one hour later, Disp: 90 tablet, Rfl: 4    diclofenac (VOLTAREN) 75 MG EC tablet, Take 75 mg by mouth 2 (two) times daily., Disp: , Rfl:     hydroCHLOROthiazide (HYDRODIURIL) 25 MG tablet, Take 1 tablet (25 mg total) by mouth once daily., Disp: 90 tablet, Rfl: 1    lisinopriL (PRINIVIL,ZESTRIL) 40 MG tablet, TAKE 1 TABLET(40 MG) BY MOUTH EVERY DAY, Disp: 90 tablet, Rfl: 4    tadalafiL (CIALIS) 20 MG Tab, Take 1 tablet (20 mg total) by mouth daily as needed (prior to sexual activity)., Disp: 30 tablet, Rfl: 11    Review of patient's allergies indicates:  No Known Allergies      Review of Systems:  See HPI      Physical Exam:  /77 (BP Location: Right forearm, Patient Position: Sitting, BP Method: Large (Automatic))   Pulse 68   Temp 97.5 °F (36.4 °C) (Oral)   Ht 5' 11" (1.803 m)   Wt 105.2 kg (232 lb)   SpO2 100%   BMI 32.36 kg/m²     Gen-well-appearing adult male, NAD  CV -no extremity swelling  Resp-breathing nonlabored  MSK- left shoulder-no visible deformity or bony tenderness.  Pain " elicited with left arm adduction, positive Neer's and Boo, positive Magdi's test.  Significant range of motion with abduction, adduction, flexion, extension, internal and external rotation.   Neuro- strength and peripheral sensation intact.  Skin-no wound or rash      Assessment/Plan:    1. Hyperlipidemia, unspecified hyperlipidemia type  - Off of lipid lowering meds, will repeat labs to evaluate needs    2. Acute pain of left shoulder  - Differential includes not limited to rotator cuff tear/tendinopathy, deltoid muscle strain/tendinopathy, impingement syndrome, shoulder osteoarthritis  - Instructed to take Tylenol Arthritis 2 tabs, t.i.d. for 5 days for anti-inflammatory effects   - X-ray shoulder ordered, she was narrowing of the acromioclavicular joint with degenerative changes  - rotator cuff exercise provided to patient  - X-Ray Shoulder 2 or More Views Left; Future    Follow up 3 months, earlier if needed    Eagle Aleman MD  [employer]  Resident HO-3

## 2024-05-14 ENCOUNTER — LAB VISIT (OUTPATIENT)
Dept: LAB | Facility: HOSPITAL | Age: 64
End: 2024-05-14
Attending: UROLOGY
Payer: MEDICAID

## 2024-05-14 DIAGNOSIS — C61 PROSTATE CANCER: ICD-10-CM

## 2024-05-14 LAB — PSA SERPL-MCNC: 1.51 NG/ML

## 2024-05-14 PROCEDURE — 36415 COLL VENOUS BLD VENIPUNCTURE: CPT

## 2024-05-14 PROCEDURE — 84153 ASSAY OF PSA TOTAL: CPT

## 2024-05-17 ENCOUNTER — OFFICE VISIT (OUTPATIENT)
Dept: UROLOGY | Facility: CLINIC | Age: 64
End: 2024-05-17
Payer: MEDICAID

## 2024-05-17 VITALS
HEART RATE: 72 BPM | OXYGEN SATURATION: 98 % | WEIGHT: 234.63 LBS | DIASTOLIC BLOOD PRESSURE: 79 MMHG | HEIGHT: 71 IN | TEMPERATURE: 98 F | SYSTOLIC BLOOD PRESSURE: 133 MMHG | RESPIRATION RATE: 20 BRPM | BODY MASS INDEX: 32.85 KG/M2

## 2024-05-17 DIAGNOSIS — N52.35 ERECTILE DYSFUNCTION FOLLOWING RADIATION THERAPY: ICD-10-CM

## 2024-05-17 DIAGNOSIS — M10.49 OTHER SECONDARY ACUTE GOUT OF MULTIPLE SITES: ICD-10-CM

## 2024-05-17 DIAGNOSIS — C61 PROSTATE CANCER: Primary | ICD-10-CM

## 2024-05-17 PROCEDURE — 81001 URINALYSIS AUTO W/SCOPE: CPT | Mod: PBBFAC | Performed by: UROLOGY

## 2024-05-17 PROCEDURE — 3075F SYST BP GE 130 - 139MM HG: CPT | Mod: CPTII,,, | Performed by: UROLOGY

## 2024-05-17 PROCEDURE — 4010F ACE/ARB THERAPY RXD/TAKEN: CPT | Mod: CPTII,,, | Performed by: UROLOGY

## 2024-05-17 PROCEDURE — 3008F BODY MASS INDEX DOCD: CPT | Mod: CPTII,,, | Performed by: UROLOGY

## 2024-05-17 PROCEDURE — 1159F MED LIST DOCD IN RCRD: CPT | Mod: CPTII,,, | Performed by: UROLOGY

## 2024-05-17 PROCEDURE — 99214 OFFICE O/P EST MOD 30 MIN: CPT | Mod: S$PBB,,, | Performed by: UROLOGY

## 2024-05-17 PROCEDURE — 99213 OFFICE O/P EST LOW 20 MIN: CPT | Mod: PBBFAC | Performed by: UROLOGY

## 2024-05-17 PROCEDURE — 3078F DIAST BP <80 MM HG: CPT | Mod: CPTII,,, | Performed by: UROLOGY

## 2024-05-17 PROCEDURE — 1160F RVW MEDS BY RX/DR IN RCRD: CPT | Mod: CPTII,,, | Performed by: UROLOGY

## 2024-05-17 RX ORDER — COLCHICINE 0.6 MG/1
TABLET ORAL
Qty: 90 TABLET | Refills: 4 | Status: SHIPPED | OUTPATIENT
Start: 2024-05-17

## 2024-05-17 RX ORDER — DICLOFENAC SODIUM 75 MG/1
75 TABLET, DELAYED RELEASE ORAL 2 TIMES DAILY
Qty: 60 TABLET | Refills: 1 | Status: SHIPPED | OUTPATIENT
Start: 2024-05-17

## 2024-05-17 NOTE — PROGRESS NOTES
CC:  Prostate cancer    HPI:  Eduin Ortiz is a 64 y.o. male seen for follow-up of prostate cancer.   The patient had a PSA of 5.82 on 3 February 2022.  He underwent a prostate biopsy on 31 May 2022.  The biopsy showed [place] grade 3+3 in the right and left apex.  There was a question of some bony lesions so these were biopsied and that was negative for cancer.  He elected to have EBRT which he completed in August 2022.  Following radiation his PSA was 4.2 and remained that for four months so I ordered a PSMA PET scan which was essentially negative for metastatic disease.  PSA has been fairly stable since then.  He has no urinary complaints.  He complains of erectile dysfunction.  He was given tadalfil at his last visit.    He requests a refill of colchicine and diclofenac that he uses for gout.      Urinalysis:  Results for orders placed or performed in visit on 05/17/24   POCT URINE DIPSTICK WITH MICROSCOPE, AUTOMATED   Result Value Ref Range    Color, UA Yellow     Spec Grav UA 1.025     pH, UA 6.0     WBC, UA neg     Nitrite, UA neg     Protein, POC neg     Glucose, UA neg     Ketones, UA neg     Urobilinogen, UA 0.2     Bilirubin, POC neg     Blood, UA neg      Microscopic Urinalysis:  WBC:   None per HPF     RBC:    None per HPF     Bacteria:    None per HPF     Squamous epithelial cells:    None per HPF      Crystals:   None    Lab Results:  PSA History:    02/04/21 09:30 02/09/21 10:00 02/03/22 11:01 11/28/22 09:21 03/01/23 09:10 04/25/23 07:01 07/26/23 06:35 11/09/23 08:21 02/06/24 08:56 05/14/24 10:18   5.38 (H) 4.59 (H) 5.82 4.20 (H) 4.26 (H) 2.16 1.61 1.33 1.35 1.51     ROS:  All systems reviewed and are negative except as documented in HPI and/or Assessment and Plan.     Patient Active Problem List:     Patient Active Problem List   Diagnosis    Elevated PSA    Hypertension    HLD (hyperlipidemia)    Tinea versicolor    Prostate cancer    Erectile dysfunction        Past Medical History:  Past Medical  History:   Diagnosis Date    Gout, unspecified     HLD (hyperlipidemia)     Hypertension     Obesity, Class I, BMI 30-34.9     Tinea versicolor         Past Surgical History:  Past Surgical History:   Procedure Laterality Date    APPENDECTOMY      CIRCUMCISION      ENDOSCOPY      INNER EAR SURGERY      KNEE CARTILAGE SURGERY      TONSILLECTOMY      TRANSRECTAL BIOPSY OF PROSTATE WITH ULTRASOUND GUIDANCE N/A 5/31/2022    Procedure: BIOPSY, PROSTATE, RECTAL APPROACH, WITH US GUIDANCE;  Surgeon: Colton Herrera MD;  Location[facility] ENDOSCOPY;  Service: Urology;  Laterality: N/A;        Family History:  Family History   Problem Relation Name Age of Onset    Lung cancer Mother      Liver cancer Father      Drug abuse Sister      Lung cancer Brother          Social History:  Social History     Socioeconomic History    Marital status:    Tobacco Use    Smoking status: Never     Passive exposure: Never    Smokeless tobacco: Never   Substance and Sexual Activity    Alcohol use: Yes     Alcohol/week: 4.0 standard drinks of alcohol     Types: 4 Cans of beer per week     Comment: 1-2 times weekly    Drug use: Not Currently     Types: Marijuana     Social Determinants of Health     Financial Resource Strain: Low Risk  (3/14/2024)    Overall Financial Resource Strain (CARDIA)     Difficulty of Paying Living Expenses: Not hard at all   Food Insecurity: No Food Insecurity (3/14/2024)    Hunger Vital Sign     Worried About Running Out of Food in the Last Year: Never true     Ran Out of Food in the Last Year: Never true   Transportation Needs: No Transportation Needs (3/21/2024)    PRAPARE - Transportation     Lack of Transportation (Medical): No     Lack of Transportation (Non-Medical): No   Physical Activity: Sufficiently Active (3/14/2024)    Exercise Vital Sign     Days of Exercise per Week: 3 days     Minutes of Exercise per Session: 60 min   Stress: No Stress Concern Present (3/14/2024)    [facility] of  Occupational Health - Occupational Stress Questionnaire     Feeling of Stress : Not at all   Housing Stability: Low Risk  (3/14/2024)    Housing Stability Vital Sign     Unable to Pay for Housing in the Last Year: No     Number of Places Lived in the Last Year: 1     Unstable Housing in the Last Year: No        Allergies:  Review of patient's allergies indicates:  No Known Allergies     Objective:  Vitals:    05/17/24 0835   BP: 133/79   Pulse: 72   Resp: 20   Temp: 98.2 °F (36.8 °C)     General:  Well developed, well nourished adult male in no acute distress  Abdomen: Soft, nontender, no masses  Extremities:  No clubbing, cyanosis, or edema  Neurologic:  Grossly intact  Musculoskeletal:  Normal tone    Assessment:  1. Prostate cancer  - POCT URINE DIPSTICK WITH MICROSCOPE, AUTOMATED  - PSA, Total (Diagnostic); Future    2. Erectile dysfunction following radiation therapy    3. Other secondary acute gout of multiple sites  - colchicine (COLCRYS) 0.6 mg tablet; Take 2 tabs by mouth at start of gout flare followed by 1 tab one hour later  Dispense: 90 tablet; Refill: 4  - diclofenac (VOLTAREN) 75 MG EC tablet; Take 1 tablet (75 mg total) by mouth 2 (two) times daily.  Dispense: 60 tablet; Refill: 1     Plan:  The PSA has been and down a little however it has remained fairly stable.  Continue to watch this with a repeat PSA in three months.  Continues Tadalafil.  He was given a refill diclofenac and colchicine that he keeps on hand for episodes of gout.    Follow-up:  PSA in three months and follow-up after.

## 2024-05-20 NOTE — PROGRESS NOTES
Date of Service: 3/21/2024    Attending Attestation: Patient discussed with resident. The chart was reviewed thoroughly including pertinent vitals, labs, imaging, medications, prior notes, and consultant/specialist recommendations.  I participated in the management of the patient, examined the patient, reviewed the summary of the plan, and was immediately available at all times throughout the encounter. Services were furnished in a primary care center located in the outpatient department of a [facility] hospital. I agree with the resident's findings and plan as documented in the resident's note.    Edna Duncan MD  Attending - Family Medicine / Geriatric Medicine  [facility] [place], [facility]

## 2024-06-14 ENCOUNTER — OFFICE VISIT (OUTPATIENT)
Dept: FAMILY MEDICINE | Facility: CLINIC | Age: 64
End: 2024-06-14
Payer: MEDICAID

## 2024-06-14 VITALS
RESPIRATION RATE: 20 BRPM | DIASTOLIC BLOOD PRESSURE: 84 MMHG | HEIGHT: 70 IN | BODY MASS INDEX: 33.36 KG/M2 | HEART RATE: 69 BPM | WEIGHT: 233 LBS | SYSTOLIC BLOOD PRESSURE: 137 MMHG | TEMPERATURE: 98 F | OXYGEN SATURATION: 97 %

## 2024-06-14 DIAGNOSIS — E78.5 HYPERLIPIDEMIA, UNSPECIFIED HYPERLIPIDEMIA TYPE: Primary | ICD-10-CM

## 2024-06-14 LAB
CHOLEST SERPL-MCNC: 142 MG/DL
CHOLEST/HDLC SERPL: 3 {RATIO} (ref 0–5)
HDLC SERPL-MCNC: 44 MG/DL (ref 35–60)
LDLC SERPL CALC-MCNC: 69 MG/DL (ref 50–140)
TRIGL SERPL-MCNC: 143 MG/DL (ref 34–140)
VLDLC SERPL CALC-MCNC: 29 MG/DL

## 2024-06-14 PROCEDURE — 80061 LIPID PANEL: CPT | Performed by: STUDENT IN AN ORGANIZED HEALTH CARE EDUCATION/TRAINING PROGRAM

## 2024-06-14 PROCEDURE — 99214 OFFICE O/P EST MOD 30 MIN: CPT | Mod: PBBFAC | Performed by: STUDENT IN AN ORGANIZED HEALTH CARE EDUCATION/TRAINING PROGRAM

## 2024-06-14 PROCEDURE — 36415 COLL VENOUS BLD VENIPUNCTURE: CPT | Performed by: STUDENT IN AN ORGANIZED HEALTH CARE EDUCATION/TRAINING PROGRAM

## 2024-06-14 NOTE — PROGRESS NOTES
"[facility] Progress Note    ID:  Eduin Ortiz   MRN:  [id number]     6/14/2024    Chief Complaint:  Follow up left shoulder pain    History of Present Illness:  Eduin Ortiz is a 64 y.o. male who presents follow up shoulder pain.    Left shoulder pain  - Chronic, intermittent  - Pain resolved after propping arm on pillow while asleep and with home exercises  - sparing use of acetaminophen  - shoulder x-ray with mild degenerative changes, notably in AC joint    Hyperlipidemia   - compliant with atorvastatin, using for the last 10 weeks roughly, no muscle aches        Current Outpatient Medications:     allopurinoL (ZYLOPRIM) 300 MG tablet, Take 300 mg by mouth once daily., Disp: , Rfl:     atorvastatin (LIPITOR) 40 MG tablet, Take 1 tablet (40 mg total) by mouth once daily., Disp: 90 tablet, Rfl: 3    colchicine (COLCRYS) 0.6 mg tablet, Take 2 tabs by mouth at start of gout flare followed by 1 tab one hour later, Disp: 90 tablet, Rfl: 4    diclofenac (VOLTAREN) 75 MG EC tablet, Take 1 tablet (75 mg total) by mouth 2 (two) times daily., Disp: 60 tablet, Rfl: 1    hydroCHLOROthiazide (HYDRODIURIL) 25 MG tablet, Take 1 tablet (25 mg total) by mouth once daily., Disp: 90 tablet, Rfl: 1    lisinopriL (PRINIVIL,ZESTRIL) 40 MG tablet, TAKE 1 TABLET(40 MG) BY MOUTH EVERY DAY, Disp: 90 tablet, Rfl: 4    tadalafiL (CIALIS) 20 MG Tab, Take 1 tablet (20 mg total) by mouth daily as needed (prior to sexual activity)., Disp: 30 tablet, Rfl: 11    Review of patient's allergies indicates:  No Known Allergies      Review of Systems:  See HPI      Physical Exam:  /84 (BP Location: Right arm, Patient Position: Sitting, BP Method: Medium (Automatic))   Pulse 69   Temp 97.9 °F (36.6 °C) (Oral)   Resp 20   Ht 5' 10" (1.778 m)   Wt 105.7 kg (233 lb)   SpO2 97%   BMI 33.43 kg/m²     Gen-well-appearing adult male, NAD  CV -no extremity swelling  Resp-breathing nonlabored  MSK- left shoulder-no visible deformity or bony " tenderness.  Mild pain with Neer and Boo.   Skin-no wound or rash      Assessment/Plan:    1. Hyperlipidemia, unspecified hyperlipidemia type  - repeat lipid today after resumed lipitor  - lipid panel reviewed and at goal    2. Chronic pain of left shoulder  - resolved   - continue home exercises  - Cont home exercises      Immunizations due  Informed refusal of tetanus, shingles vaccine      Follow up 3 months, earlier if needed    Eagle Aleman MD  [employer]  Resident HO-3

## 2024-07-09 RX ORDER — ALLOPURINOL 300 MG/1
300 TABLET ORAL DAILY
Qty: 30 TABLET | Refills: 0 | Status: SHIPPED | OUTPATIENT
Start: 2024-07-09

## 2024-07-20 DIAGNOSIS — M10.49 OTHER SECONDARY ACUTE GOUT OF MULTIPLE SITES: ICD-10-CM

## 2024-07-21 RX ORDER — DICLOFENAC SODIUM 75 MG/1
75 TABLET, DELAYED RELEASE ORAL 2 TIMES DAILY
Qty: 60 TABLET | Refills: 1 | Status: SHIPPED | OUTPATIENT
Start: 2024-07-21

## 2024-08-05 RX ORDER — ALLOPURINOL 300 MG/1
300 TABLET ORAL DAILY
Qty: 30 TABLET | Refills: 0 | Status: SHIPPED | OUTPATIENT
Start: 2024-08-05

## 2024-08-08 ENCOUNTER — LAB VISIT (OUTPATIENT)
Dept: LAB | Facility: HOSPITAL | Age: 64
End: 2024-08-08
Attending: UROLOGY
Payer: MEDICAID

## 2024-08-08 DIAGNOSIS — C61 PROSTATE CANCER: ICD-10-CM

## 2024-08-08 LAB — PSA SERPL-MCNC: 1.23 NG/ML

## 2024-08-08 PROCEDURE — 84153 ASSAY OF PSA TOTAL: CPT

## 2024-08-08 PROCEDURE — 36415 COLL VENOUS BLD VENIPUNCTURE: CPT

## 2024-08-12 ENCOUNTER — OFFICE VISIT (OUTPATIENT)
Dept: UROLOGY | Facility: CLINIC | Age: 64
End: 2024-08-12
Payer: MEDICAID

## 2024-08-12 VITALS
HEIGHT: 70 IN | BODY MASS INDEX: 33.5 KG/M2 | WEIGHT: 234 LBS | DIASTOLIC BLOOD PRESSURE: 72 MMHG | HEART RATE: 75 BPM | SYSTOLIC BLOOD PRESSURE: 117 MMHG | OXYGEN SATURATION: 97 % | TEMPERATURE: 98 F | RESPIRATION RATE: 20 BRPM

## 2024-08-12 DIAGNOSIS — N52.35 ERECTILE DYSFUNCTION FOLLOWING RADIATION THERAPY: ICD-10-CM

## 2024-08-12 DIAGNOSIS — C61 PROSTATE CANCER: Primary | ICD-10-CM

## 2024-08-12 LAB
BILIRUB SERPL-MCNC: NEGATIVE MG/DL
BLOOD URINE, POC: NEGATIVE
COLOR, POC UA: YELLOW
GLUCOSE UR QL STRIP: NEGATIVE
KETONES UR QL STRIP: NEGATIVE
LEUKOCYTE ESTERASE URINE, POC: NEGATIVE
NITRITE, POC UA: NEGATIVE
PH, POC UA: 5.5
PROTEIN, POC: NEGATIVE
SPECIFIC GRAVITY, POC UA: 1.02
UROBILINOGEN, POC UA: 0.2

## 2024-08-12 PROCEDURE — 3008F BODY MASS INDEX DOCD: CPT | Mod: CPTII,,, | Performed by: UROLOGY

## 2024-08-12 PROCEDURE — 3074F SYST BP LT 130 MM HG: CPT | Mod: CPTII,,, | Performed by: UROLOGY

## 2024-08-12 PROCEDURE — 81001 URINALYSIS AUTO W/SCOPE: CPT | Mod: PBBFAC | Performed by: UROLOGY

## 2024-08-12 PROCEDURE — 4010F ACE/ARB THERAPY RXD/TAKEN: CPT | Mod: CPTII,,, | Performed by: UROLOGY

## 2024-08-12 PROCEDURE — 99214 OFFICE O/P EST MOD 30 MIN: CPT | Mod: PBBFAC | Performed by: UROLOGY

## 2024-08-12 PROCEDURE — 1159F MED LIST DOCD IN RCRD: CPT | Mod: CPTII,,, | Performed by: UROLOGY

## 2024-08-12 PROCEDURE — 1160F RVW MEDS BY RX/DR IN RCRD: CPT | Mod: CPTII,,, | Performed by: UROLOGY

## 2024-08-12 PROCEDURE — 3078F DIAST BP <80 MM HG: CPT | Mod: CPTII,,, | Performed by: UROLOGY

## 2024-08-12 PROCEDURE — 99214 OFFICE O/P EST MOD 30 MIN: CPT | Mod: S$PBB,,, | Performed by: UROLOGY

## 2024-08-12 NOTE — PROGRESS NOTES
CC:  Prostate cancer    HPI:  Eduin Ortiz is a 64 y.o. male seen for follow-up of prostate cancer.  The patient had a PSA of 5.82 on 3 February 2022.  He underwent a prostate biopsy on 31 May 2022.  The biopsy showed [place] grade 3+3 in the right and left apex.  There was a question of some bony lesions so these were biopsied and that was negative for cancer.  He elected to have EBRT which he completed in August 2022.  Following radiation his PSA was 4.2 and remained that for four months so I ordered a PSMA PET scan which was essentially negative for metastatic disease.  PSA has been fairly stable since then.  He has no urinary complaints.  He complains of erectile dysfunction.  He was given tadalfil but he hasn't used it yet.  He is saving it for a back up if needed.      Urinalysis:  Results for orders placed or performed in visit on 08/12/24   POCT URINE DIPSTICK WITH MICROSCOPE, AUTOMATED   Result Value Ref Range    Color, UA Yellow     Spec Grav UA 1.020     pH, UA 5.5     WBC, UA Negative     Nitrite, UA Negative     Protein, POC Negative     Glucose, UA Negative     Ketones, UA Negative     Urobilinogen, UA 0.2     Bilirubin, POC Negative     Blood, UA Negative      Microscopic Urinalysis:  WBC:   None per HPF     RBC:    None per HPF     Bacteria:    None per HPF     Squamous epithelial cells:  None per HPF      Crystals:   None    Lab Results:  PSA History:    02/04/21 09:30 02/09/21 10:00 02/03/22 11:01 11/28/22 09:21 03/01/23 09:10 04/25/23 07:01 07/26/23 06:35 11/09/23 08:21 02/06/24 08:56 05/14/24 10:18 08/08/24 12:27   5.38 (H) 4.59 (H) 5.82 4.20 (H) 4.26 (H) 2.16 1.61 1.33 1.35 1.51 1.23     ROS:  All systems reviewed and are negative except as documented in HPI and/or Assessment and Plan.     Patient Active Problem List:     Patient Active Problem List   Diagnosis    Elevated PSA    Hypertension    HLD (hyperlipidemia)    Tinea versicolor    Prostate cancer    Erectile dysfunction        Past  Medical History:  Past Medical History:   Diagnosis Date    Gout, unspecified     HLD (hyperlipidemia)     Hypertension     Obesity, Class I, BMI 30-34.9     Tinea versicolor         Past Surgical History:  Past Surgical History:   Procedure Laterality Date    APPENDECTOMY      CIRCUMCISION      ENDOSCOPY      INNER EAR SURGERY      KNEE CARTILAGE SURGERY      TONSILLECTOMY      TRANSRECTAL BIOPSY OF PROSTATE WITH ULTRASOUND GUIDANCE N/A 5/31/2022    Procedure: BIOPSY, PROSTATE, RECTAL APPROACH, WITH US GUIDANCE;  Surgeon: Colton Herrera MD;  Location: [facility] ENDOSCOPY;  Service: Urology;  Laterality: N/A;        Family History:  Family History   Problem Relation Name Age of Onset    Lung cancer Mother      Liver cancer Father      Drug abuse Sister      Lung cancer Brother          Social History:  Social History     Socioeconomic History    Marital status:    Tobacco Use    Smoking status: Never     Passive exposure: Never    Smokeless tobacco: Never   Substance and Sexual Activity    Alcohol use: Yes     Alcohol/week: 4.0 standard drinks of alcohol     Types: 4 Cans of beer per week     Comment: 1-2 times weekly    Drug use: Not Currently     Types: Marijuana     Social Determinants of Health     Financial Resource Strain: Low Risk  (3/14/2024)    Overall Financial Resource Strain (CARDIA)     Difficulty of Paying Living Expenses: Not hard at all   Food Insecurity: No Food Insecurity (3/14/2024)    Hunger Vital Sign     Worried About Running Out of Food in the Last Year: Never true     Ran Out of Food in the Last Year: Never true   Transportation Needs: No Transportation Needs (3/21/2024)    PRAPARE - Transportation     Lack of Transportation (Medical): No     Lack of Transportation (Non-Medical): No   Physical Activity: Sufficiently Active (3/14/2024)    Exercise Vital Sign     Days of Exercise per Week: 3 days     Minutes of Exercise per Session: 60 min   Stress: No Stress Concern Present (3/14/2024)     [facility] of Occupational Health - Occupational Stress Questionnaire     Feeling of Stress : Not at all   Housing Stability: Low Risk  (3/14/2024)    Housing Stability Vital Sign     Unable to Pay for Housing in the Last Year: No     Number of Places Lived in the Last Year: 1     Unstable Housing in the Last Year: No        Allergies:  Review of patient's allergies indicates:  No Known Allergies     Objective:  Vitals:    08/12/24 0848   BP: 117/72   Pulse: 75   Resp: 20   Temp: 98.2 °F (36.8 °C)     General:  Well developed, well nourished adult male in no acute distress  Abdomen: Soft, nontender, no masses  Extremities:  No clubbing, cyanosis, or edema  Neurologic:  Grossly intact  Musculoskeletal:  Normal tone    Assessment:  1. Prostate cancer  - POCT URINE DIPSTICK WITH MICROSCOPE, AUTOMATED  - PSA, Total (Diagnostic); Future    2. Erectile dysfunction following radiation therapy     Plan:  PSA continues to trend down.  Repeat in three months.   Continue Tadalafil as needed.      Follow-up:  PSA in three months and follow-up after.

## 2024-09-04 ENCOUNTER — OFFICE VISIT (OUTPATIENT)
Dept: FAMILY MEDICINE | Facility: CLINIC | Age: 64
End: 2024-09-04
Payer: MEDICAID

## 2024-09-04 VITALS
HEIGHT: 69 IN | OXYGEN SATURATION: 99 % | WEIGHT: 232.81 LBS | TEMPERATURE: 98 F | BODY MASS INDEX: 34.48 KG/M2 | SYSTOLIC BLOOD PRESSURE: 124 MMHG | DIASTOLIC BLOOD PRESSURE: 71 MMHG | HEART RATE: 87 BPM

## 2024-09-04 DIAGNOSIS — B36.0 TINEA VERSICOLOR: ICD-10-CM

## 2024-09-04 DIAGNOSIS — I10 PRIMARY HYPERTENSION: ICD-10-CM

## 2024-09-04 DIAGNOSIS — E78.5 HYPERLIPIDEMIA, UNSPECIFIED HYPERLIPIDEMIA TYPE: ICD-10-CM

## 2024-09-04 DIAGNOSIS — M10.49 OTHER SECONDARY ACUTE GOUT OF MULTIPLE SITES: ICD-10-CM

## 2024-09-04 LAB
ALBUMIN SERPL-MCNC: 4.6 G/DL (ref 3.4–4.8)
ALBUMIN/GLOB SERPL: 1.1 RATIO (ref 1.1–2)
ALP SERPL-CCNC: 79 UNIT/L (ref 40–150)
ALT SERPL-CCNC: 64 UNIT/L (ref 0–55)
ANION GAP SERPL CALC-SCNC: 10 MEQ/L
AST SERPL-CCNC: 33 UNIT/L (ref 5–34)
BASOPHILS # BLD AUTO: 0.08 X10(3)/MCL
BASOPHILS NFR BLD AUTO: 0.8 %
BILIRUB SERPL-MCNC: 0.5 MG/DL
BUN SERPL-MCNC: 33.1 MG/DL (ref 8.4–25.7)
CALCIUM SERPL-MCNC: 10.4 MG/DL (ref 8.8–10)
CHLORIDE SERPL-SCNC: 105 MMOL/L (ref 98–107)
CHOLEST SERPL-MCNC: 172 MG/DL
CHOLEST/HDLC SERPL: 4 {RATIO} (ref 0–5)
CO2 SERPL-SCNC: 26 MMOL/L (ref 23–31)
CREAT SERPL-MCNC: 1.62 MG/DL (ref 0.73–1.18)
CREAT/UREA NIT SERPL: 20
EOSINOPHIL # BLD AUTO: 0.19 X10(3)/MCL (ref 0–0.9)
EOSINOPHIL NFR BLD AUTO: 1.8 %
ERYTHROCYTE [DISTWIDTH] IN BLOOD BY AUTOMATED COUNT: 13.4 % (ref 11.5–17)
EST. AVERAGE GLUCOSE BLD GHB EST-MCNC: 102.5 MG/DL
GFR SERPLBLD CREATININE-BSD FMLA CKD-EPI: 47 ML/MIN/1.73/M2
GLOBULIN SER-MCNC: 4.3 GM/DL (ref 2.4–3.5)
GLUCOSE SERPL-MCNC: 105 MG/DL (ref 82–115)
HBA1C MFR BLD: 5.2 %
HCT VFR BLD AUTO: 43.4 % (ref 42–52)
HDLC SERPL-MCNC: 44 MG/DL (ref 35–60)
HGB BLD-MCNC: 14.6 G/DL (ref 14–18)
IMM GRANULOCYTES # BLD AUTO: 0.06 X10(3)/MCL (ref 0–0.04)
IMM GRANULOCYTES NFR BLD AUTO: 0.6 %
LDLC SERPL CALC-MCNC: 73 MG/DL (ref 50–140)
LYMPHOCYTES # BLD AUTO: 2.06 X10(3)/MCL (ref 0.6–4.6)
LYMPHOCYTES NFR BLD AUTO: 19.5 %
MCH RBC QN AUTO: 29.9 PG (ref 27–31)
MCHC RBC AUTO-ENTMCNC: 33.6 G/DL (ref 33–36)
MCV RBC AUTO: 88.9 FL (ref 80–94)
MONOCYTES # BLD AUTO: 0.77 X10(3)/MCL (ref 0.1–1.3)
MONOCYTES NFR BLD AUTO: 7.3 %
NEUTROPHILS # BLD AUTO: 7.39 X10(3)/MCL (ref 2.1–9.2)
NEUTROPHILS NFR BLD AUTO: 70 %
NRBC BLD AUTO-RTO: 0 %
PLATELET # BLD AUTO: 315 X10(3)/MCL (ref 130–400)
PLATELETS.RETICULATED NFR BLD AUTO: 5.6 % (ref 0.9–11.2)
PMV BLD AUTO: 11.2 FL (ref 7.4–10.4)
POTASSIUM SERPL-SCNC: 4.1 MMOL/L (ref 3.5–5.1)
PROT SERPL-MCNC: 8.9 GM/DL (ref 5.8–7.6)
RBC # BLD AUTO: 4.88 X10(6)/MCL (ref 4.7–6.1)
SODIUM SERPL-SCNC: 141 MMOL/L (ref 136–145)
TRIGL SERPL-MCNC: 275 MG/DL (ref 34–140)
TSH SERPL-ACNC: 0.89 UIU/ML (ref 0.35–4.94)
VLDLC SERPL CALC-MCNC: 55 MG/DL
WBC # BLD AUTO: 10.55 X10(3)/MCL (ref 4.5–11.5)

## 2024-09-04 PROCEDURE — 85025 COMPLETE CBC W/AUTO DIFF WBC: CPT

## 2024-09-04 PROCEDURE — 99214 OFFICE O/P EST MOD 30 MIN: CPT | Mod: PBBFAC

## 2024-09-04 PROCEDURE — 80053 COMPREHEN METABOLIC PANEL: CPT

## 2024-09-04 PROCEDURE — 83036 HEMOGLOBIN GLYCOSYLATED A1C: CPT

## 2024-09-04 PROCEDURE — 84443 ASSAY THYROID STIM HORMONE: CPT

## 2024-09-04 PROCEDURE — 80061 LIPID PANEL: CPT

## 2024-09-04 PROCEDURE — 36415 COLL VENOUS BLD VENIPUNCTURE: CPT

## 2024-09-04 RX ORDER — LISINOPRIL 40 MG/1
40 TABLET ORAL DAILY
Qty: 90 TABLET | Refills: 4 | Status: SHIPPED | OUTPATIENT
Start: 2024-09-04

## 2024-09-04 RX ORDER — HYDROCHLOROTHIAZIDE 25 MG/1
25 TABLET ORAL DAILY
Qty: 90 TABLET | Refills: 1 | Status: SHIPPED | OUTPATIENT
Start: 2024-09-04 | End: 2025-03-03

## 2024-09-04 RX ORDER — ATORVASTATIN CALCIUM 40 MG/1
40 TABLET, FILM COATED ORAL DAILY
Qty: 90 TABLET | Refills: 3 | Status: SHIPPED | OUTPATIENT
Start: 2024-09-04

## 2024-09-04 RX ORDER — KETOCONAZOLE 200 MG/1
200 TABLET ORAL DAILY
Qty: 7 TABLET | Refills: 0 | Status: SHIPPED | OUTPATIENT
Start: 2024-09-04 | End: 2024-09-11

## 2024-09-04 RX ORDER — ALLOPURINOL 300 MG/1
300 TABLET ORAL DAILY
Qty: 30 TABLET | Refills: 0 | Status: SHIPPED | OUTPATIENT
Start: 2024-09-04

## 2024-09-04 NOTE — PROGRESS NOTES
"[facility] Note    Subjective:     Patient ID: Eduin CALIXTO Use is a 64 y.o. male    Chief Complaint:   Chief Complaint   Patient presents with    Follow-up    left shoulder pain    Medication Refill     All meds refill for 90 days if possible.        HPI  Eduin CALIXTO Use is a 64 y.o. male who presents for a rash. He reports that the rash began three weeks ago on his abdomen and has spread to his back.         Review of Systems  As per [facility]    Objective:         6/14/2024     8:28 AM 8/12/2024     8:48 AM 9/4/2024     8:34 AM   Vitals - 1 value per visit   SYSTOLIC 137 117 124   DIASTOLIC 84 72 71   Pulse 69 75 87   Temp 97.9 °F (36.6 °C) 98.2 °F (36.8 °C) 98.2 °F (36.8 °C)   Resp 20 20    SPO2 97 % 97 % 99 %   Weight (lb) 233 234 232.8   Weight (kg) 105.688 106.142 105.597   Height 5' 10" (1.778 m) 5' 9.69" (1.77 m) 5' 9" (1.753 m)   BMI (Calculated) 33.4 33.9 34.4   Pain Score Zero Zero Zero     Physical Exam    Assessment & Plan     Assessment & Plan        Problem #1  -     Problem #2  -     Problem #3  -     Problem #4  -     Health Maintenance  -   -  -  -  -  -    Orders Placed This Encounter    TSH    Hemoglobin A1C    Lipid Panel    Comprehensive Metabolic Panel    CBC Auto Differential    CBC with Differential    ketoconazole (NIZORAL) 200 mg Tab    hydroCHLOROthiazide (HYDRODIURIL) 25 MG tablet    atorvastatin (LIPITOR) 40 MG tablet    lisinopriL (PRINIVIL,ZESTRIL) 40 MG tablet    allopurinoL (ZYLOPRIM) 300 MG tablet     Health Maintenance   Topic Date Due    TETANUS VACCINE  Never done    Shingles Vaccine (1 of 2) Never done    Colorectal Cancer Screening  Never done    Lipid Panel  06/14/2029    Hepatitis C Screening  Completed     Future Appointments   Date Time Provider Department Center   11/11/2024 11:00 AM Sissy Moran DO [facility] [place]   12/10/2024  1:00 PM Prabhu Mcintosh MD [facility] [place]       RTC in ***    Prabhu Mcintosh MD  [facility], PGY-2          "

## 2024-09-04 NOTE — PROGRESS NOTES
"[facility] Note    Subjective:     Patient ID: Eduin CALIXTO Use is a 64 y.o. male    Chief Complaint:   Chief Complaint   Patient presents with    Follow-up    left shoulder pain    Medication Refill     All meds refill for 90 days if possible.        HPI  Eduin CALIXTO Use is a 64 y.o. male who presents for follow-up     Rash - Right torso. Tinea versicolor. Heat worsens it. Interrupts ability to sun-tan. Occasionally itches. Failed topical therapy for it in the past, and it was unsuccessful. Has had good success w/o PO ketoconazole.     Bilateral Shoulder Pain: Right shoulder pain. Improved now. Tolerates it w/diclofenac PO PRN. Arthritis tylenol daily has been helpful. Symptoms aggravated by sleep.     HTN - Well controlled.     Anh - Rad Onc  Shaniqua - Urology  Dr. Medrano - Colonoscopy     Review of Systems  As per HPI    Objective:         6/14/2024     8:28 AM 8/12/2024     8:48 AM 9/4/2024     8:34 AM   Vitals - 1 value per visit   SYSTOLIC 137 117 124   DIASTOLIC 84 72 71   Pulse 69 75 87   Temp 97.9 °F (36.6 °C) 98.2 °F (36.8 °C) 98.2 °F (36.8 °C)   Resp 20 20    SPO2 97 % 97 % 99 %   Weight (lb) 233 234 232.8   Weight (kg) 105.688 106.142 105.597   Height 5' 10" (1.778 m) 5' 9.69" (1.77 m) 5' 9" (1.753 m)   BMI (Calculated) 33.4 33.9 34.4   Pain Score Zero Zero Zero     Physical Exam  Constitutional:       General: He is not in acute distress.     Appearance: He is not ill-appearing.   Cardiovascular:      Rate and Rhythm: Normal rate and regular rhythm.      Heart sounds: No murmur heard.  Pulmonary:      Effort: No respiratory distress.      Breath sounds: Normal breath sounds. No wheezing.   Abdominal:      Palpations: Abdomen is soft.   Musculoskeletal:         General: No swelling, tenderness or deformity.      Comments: Full ROM of BUE   Skin:     General: Skin is warm.      Capillary Refill: Capillary refill takes less than 2 seconds.      Findings: Rash present.   Neurological:      " Mental Status: Mental status is at baseline.      Motor: No weakness.   Psychiatric:         Mood and Affect: Mood normal.           The 10-year ASCVD risk score (Parish DENNIS, et al., 2019) is: 12.6%    Values used to calculate the score:      Age: 64 years      Sex: Male      Is Non- : No      Diabetic: No      Tobacco smoker: No      Systolic Blood Pressure: 124 mmHg      Is BP treated: Yes      HDL Cholesterol: 44 mg/dL      Total Cholesterol: 172 mg/dL     Latest Reference Range & Units 09/04/24 09:45   WBC 4.50 - 11.50 x10(3)/mcL 10.55   RBC 4.70 - 6.10 x10(6)/mcL 4.88   Hemoglobin 14.0 - 18.0 g/dL 14.6   Hematocrit 42.0 - 52.0 % 43.4   MCV 80.0 - 94.0 fL 88.9   MCH 27.0 - 31.0 pg 29.9   MCHC 33.0 - 36.0 g/dL 33.6   RDW 11.5 - 17.0 % 13.4   Platelet Count 130 - 400 x10(3)/mcL 315   Immature Platelet Fraction 0.9 - 11.2 % 5.6   MPV 7.4 - 10.4 fL 11.2 (H)   Neut % % 70.0   LYMPH % % 19.5   Mono % % 7.3   Eos % % 1.8   Basophil % % 0.8   Immature Granulocytes % 0.6   Neut # 2.1 - 9.2 x10(3)/mcL 7.39   Lymph # 0.6 - 4.6 x10(3)/mcL 2.06   Mono # 0.1 - 1.3 x10(3)/mcL 0.77   Eos # 0 - 0.9 x10(3)/mcL 0.19   Baso # <=0.2 x10(3)/mcL 0.08   Immature Grans (Abs) 0 - 0.04 x10(3)/mcL 0.06 (H)   nRBC % 0.0   Sodium 136 - 145 mmol/L 141   Potassium 3.5 - 5.1 mmol/L 4.1   Chloride 98 - 107 mmol/L 105   CO2 23 - 31 mmol/L 26   Anion Gap mEq/L 10.0   BUN 8.4 - 25.7 mg/dL 33.1 (H)   Creatinine 0.73 - 1.18 mg/dL 1.62 (H)   BUN/CREAT RATIO  20   eGFR mL/min/1.73/m2 47   Glucose 82 - 115 mg/dL 105   Calcium 8.8 - 10.0 mg/dL 10.4 (H)   ALP 40 - 150 unit/L 79   PROTEIN TOTAL 5.8 - 7.6 gm/dL 8.9 (H)   Albumin 3.4 - 4.8 g/dL 4.6   Albumin/Globulin Ratio 1.1 - 2.0 ratio 1.1   BILIRUBIN TOTAL <=1.5 mg/dL 0.5   AST 5 - 34 unit/L 33   ALT 0 - 55 unit/L 64 (H)   Globulin, Total 2.4 - 3.5 gm/dL 4.3 (H)   Cholesterol Total <=200 mg/dL 172   HDL 35 - 60 mg/dL 44   Total Cholesterol/HDL Ratio 0 - 5  4   Triglycerides 34 -  140 mg/dL 275 (H)   LDL Cholesterol 50.00 - 140.00 mg/dL 73.00   Very Low Density Lipoprotein  55   Hemoglobin A1C External <=7.0 % 5.2   Estimated Avg Glucose mg/dL 102.5   TSH 0.350 - 4.940 uIU/mL 0.894   (H): Data is abnormally high    Assessment & Plan     1. Tinea versicolor  Will prescribe Nizoral  -     ketoconazole (NIZORAL) 200 mg Tab; Take 1 tablet (200 mg total) by mouth once daily. for 7 days  Dispense: 7 tablet; Refill: 0    2. Hyperlipidemia, unspecified hyperlipidemia type  Lipid panel ordered  Awaiting results  -     atorvastatin (LIPITOR) 40 MG tablet; Take 1 tablet (40 mg total) by mouth once daily.  Dispense: 90 tablet; Refill: 3  -     Hemoglobin A1C; Future; Expected date: 09/04/2024  -     Lipid Panel; Future; Expected date: 09/04/2024    3. Primary hypertension  /71  Will continue current regime  -     hydroCHLOROthiazide (HYDRODIURIL) 25 MG tablet; Take 1 tablet (25 mg total) by mouth once daily.  Dispense: 90 tablet; Refill: 1  -     lisinopriL (PRINIVIL,ZESTRIL) 40 MG tablet; Take 1 tablet (40 mg total) by mouth once daily.  Dispense: 90 tablet; Refill: 4  -     Comprehensive Metabolic Panel; Future; Expected date: 09/04/2024  -     CBC Auto Differential; Future; Expected date: 09/04/2024    4. Other secondary acute gout of multiple sites  No acute flairs  CCM  -     allopurinoL (ZYLOPRIM) 300 MG tablet; Take 1 tablet (300 mg total) by mouth once daily.  Dispense: 30 tablet; Refill: 0  -     TSH; Future; Expected date: 09/04/2024    Elevated Ca and Cr noted on labs      Health Maintenance  - Dr. Medrano doing colonoscopies ; Next one due at age 66.   - Informed refusal of vaccines.     Orders Placed This Encounter    TSH    Hemoglobin A1C    Lipid Panel    Comprehensive Metabolic Panel    CBC Auto Differential    CBC with Differential    ketoconazole (NIZORAL) 200 mg Tab    hydroCHLOROthiazide (HYDRODIURIL) 25 MG tablet    atorvastatin (LIPITOR) 40 MG tablet    lisinopriL  (PRINIVIL,ZESTRIL) 40 MG tablet    allopurinoL (ZYLOPRIM) 300 MG tablet     Health Maintenance   Topic Date Due    TETANUS VACCINE  Never done    Shingles Vaccine (1 of 2) Never done    Colorectal Cancer Screening  Never done    Lipid Panel  09/04/2029    Hepatitis C Screening  Completed     Future Appointments   Date Time Provider Department Center   11/11/2024 11:00 AM [facility]   12/9/2024  1:20 PM Prabhu Mcintosh MD [facility] [place]    12/10/2024  1:00 PM Prabhu Mcintosh MD [facility] [place]     RTC in 3 mo    Prabhu Mcintosh MD  [employer], PGY-2

## 2024-09-06 ENCOUNTER — TELEPHONE (OUTPATIENT)
Dept: FAMILY MEDICINE | Facility: CLINIC | Age: 64
End: 2024-09-06
Payer: MEDICAID

## 2024-09-15 DIAGNOSIS — M10.49 OTHER SECONDARY ACUTE GOUT OF MULTIPLE SITES: ICD-10-CM

## 2024-09-15 RX ORDER — DICLOFENAC SODIUM 75 MG/1
75 TABLET, DELAYED RELEASE ORAL 2 TIMES DAILY
Qty: 60 TABLET | Refills: 1 | Status: SHIPPED | OUTPATIENT
Start: 2024-09-15

## 2024-10-03 DIAGNOSIS — M10.49 OTHER SECONDARY ACUTE GOUT OF MULTIPLE SITES: ICD-10-CM

## 2024-10-03 RX ORDER — ALLOPURINOL 300 MG/1
300 TABLET ORAL DAILY
Qty: 30 TABLET | Refills: 0 | Status: SHIPPED | OUTPATIENT
Start: 2024-10-03

## 2024-10-08 ENCOUNTER — PATIENT MESSAGE (OUTPATIENT)
Dept: ADMINISTRATIVE | Facility: HOSPITAL | Age: 64
End: 2024-10-08
Payer: MEDICAID

## 2024-10-09 DIAGNOSIS — Z12.11 SCREENING FOR COLON CANCER: ICD-10-CM

## 2024-11-08 ENCOUNTER — LAB VISIT (OUTPATIENT)
Dept: LAB | Facility: HOSPITAL | Age: 64
End: 2024-11-08
Attending: UROLOGY
Payer: MEDICAID

## 2024-11-08 DIAGNOSIS — M10.49 OTHER SECONDARY ACUTE GOUT OF MULTIPLE SITES: ICD-10-CM

## 2024-11-08 DIAGNOSIS — C61 PROSTATE CANCER: ICD-10-CM

## 2024-11-08 LAB — PSA SERPL-MCNC: 0.96 NG/ML

## 2024-11-08 PROCEDURE — 36415 COLL VENOUS BLD VENIPUNCTURE: CPT

## 2024-11-08 PROCEDURE — 84153 ASSAY OF PSA TOTAL: CPT

## 2024-11-11 ENCOUNTER — OFFICE VISIT (OUTPATIENT)
Dept: UROLOGY | Facility: CLINIC | Age: 64
End: 2024-11-11
Payer: MEDICAID

## 2024-11-11 VITALS
HEIGHT: 69 IN | HEART RATE: 75 BPM | SYSTOLIC BLOOD PRESSURE: 122 MMHG | WEIGHT: 239.81 LBS | OXYGEN SATURATION: 98 % | DIASTOLIC BLOOD PRESSURE: 70 MMHG | BODY MASS INDEX: 35.52 KG/M2 | TEMPERATURE: 98 F

## 2024-11-11 DIAGNOSIS — C61 PROSTATE CANCER: Primary | ICD-10-CM

## 2024-11-11 DIAGNOSIS — M10.49 OTHER SECONDARY ACUTE GOUT OF MULTIPLE SITES: ICD-10-CM

## 2024-11-11 LAB
BILIRUB SERPL-MCNC: NORMAL MG/DL
BLOOD URINE, POC: NORMAL
COLOR, POC UA: YELLOW
GLUCOSE UR QL STRIP: NORMAL
KETONES UR QL STRIP: NORMAL
LEUKOCYTE ESTERASE URINE, POC: NORMAL
NITRITE, POC UA: NORMAL
PH, POC UA: 5.5
PROTEIN, POC: NORMAL
SPECIFIC GRAVITY, POC UA: 1.02
UROBILINOGEN, POC UA: 0.2

## 2024-11-11 PROCEDURE — 3078F DIAST BP <80 MM HG: CPT | Mod: CPTII,,, | Performed by: UROLOGY

## 2024-11-11 PROCEDURE — 3044F HG A1C LEVEL LT 7.0%: CPT | Mod: CPTII,,, | Performed by: UROLOGY

## 2024-11-11 PROCEDURE — 1160F RVW MEDS BY RX/DR IN RCRD: CPT | Mod: CPTII,,, | Performed by: UROLOGY

## 2024-11-11 PROCEDURE — 99214 OFFICE O/P EST MOD 30 MIN: CPT | Mod: S$PBB,,, | Performed by: UROLOGY

## 2024-11-11 PROCEDURE — 3074F SYST BP LT 130 MM HG: CPT | Mod: CPTII,,, | Performed by: UROLOGY

## 2024-11-11 PROCEDURE — 3008F BODY MASS INDEX DOCD: CPT | Mod: CPTII,,, | Performed by: UROLOGY

## 2024-11-11 PROCEDURE — 4010F ACE/ARB THERAPY RXD/TAKEN: CPT | Mod: CPTII,,, | Performed by: UROLOGY

## 2024-11-11 PROCEDURE — 81001 URINALYSIS AUTO W/SCOPE: CPT | Mod: PBBFAC | Performed by: UROLOGY

## 2024-11-11 PROCEDURE — 1159F MED LIST DOCD IN RCRD: CPT | Mod: CPTII,,, | Performed by: UROLOGY

## 2024-11-11 PROCEDURE — 99214 OFFICE O/P EST MOD 30 MIN: CPT | Mod: PBBFAC | Performed by: UROLOGY

## 2024-11-11 RX ORDER — ALLOPURINOL 300 MG/1
300 TABLET ORAL DAILY
Qty: 90 TABLET | Refills: 0 | Status: SHIPPED | OUTPATIENT
Start: 2024-11-11

## 2024-11-11 RX ORDER — DICLOFENAC SODIUM 75 MG/1
75 TABLET, DELAYED RELEASE ORAL 2 TIMES DAILY
Qty: 60 TABLET | Refills: 1 | Status: SHIPPED | OUTPATIENT
Start: 2024-11-11

## 2024-11-11 NOTE — PROGRESS NOTES
Patient seen by Dr. INEZ Moran will return in 3 months with PSA. Written and verbal discharge instructions given.

## 2024-11-11 NOTE — PROGRESS NOTES
CC:  Prostate cancer    HPI:  Eduin Ortiz is a 64 y.o. male seen for follow-up of prostate cancer.  The patient had a PSA of 5.82 on 3 February 2022.  He underwent a prostate biopsy on 31 May 2022.  The biopsy showed [place] grade 3+3 in the right and left apex.  There was a question of some bony lesions so these were biopsied and that was negative for cancer.  He elected to have EBRT which he completed in August 2022.  Following radiation his PSA was 4.2 and remained that for four months so I ordered a PSMA PET scan which was essentially negative for metastatic disease.  PSA has been fairly stable since then.  He has no urinary complaints.  He complains of erectile dysfunction.  He was given tadalfil but he hasn't used it yet.  He is saving it for a back up if needed.  He has gout and takes Allopurinol but needs a refill to last until his appointment in December.      Urinalysis:  Results for orders placed or performed in visit on 11/11/24   POCT URINE DIPSTICK WITH MICROSCOPE, AUTOMATED   Result Value Ref Range    Color, UA Yellow     Spec Grav UA 1.025     pH, UA 5.5     WBC, UA neg     Nitrite, UA neg     Protein, POC trace     Glucose, UA neg     Ketones, UA trace     Urobilinogen, UA 0.2     Bilirubin, POC small     Blood, UA neg      Microscopic Urinalysis:  WBC:   None per HPF     RBC:    None per HPF     Bacteria:    None per HPF     Squamous epithelial cells:  None per HPF      Crystals:   None    Lab Results:  PSA History:    02/04/21 09:30 02/09/21 10:00 02/03/22 11:01 11/28/22 09:21 03/01/23 09:10 04/25/23 07:01 07/26/23 06:35 11/09/23 08:21 02/06/24 08:56 05/14/24 10:18 08/08/24 12:27 11/08/24 13:34   5.38 (H) 4.59 (H) 5.82 4.20 (H) 4.26 (H) 2.16 1.61 1.33 1.35 1.51 1.23 0.96     ROS:  All systems reviewed and are negative except as documented in HPI and/or Assessment and Plan.     Patient Active Problem List:     Patient Active Problem List   Diagnosis    Elevated PSA    Hypertension    HLD  (hyperlipidemia)    Tinea versicolor    Prostate cancer    Erectile dysfunction        Past Medical History:  Past Medical History:   Diagnosis Date    Gout, unspecified     HLD (hyperlipidemia)     Hypertension     Obesity, Class I, BMI 30-34.9     Tinea versicolor         Past Surgical History:  Past Surgical History:   Procedure Laterality Date    APPENDECTOMY      CIRCUMCISION      ENDOSCOPY      INNER EAR SURGERY      KNEE CARTILAGE SURGERY      TONSILLECTOMY      TRANSRECTAL BIOPSY OF PROSTATE WITH ULTRASOUND GUIDANCE N/A 5/31/2022    Procedure: BIOPSY, PROSTATE, RECTAL APPROACH, WITH US GUIDANCE;  Surgeon: Colton Herrera MD;  Location: [facility] ENDOSCOPY;  Service: Urology;  Laterality: N/A;        Family History:  Family History   Problem Relation Name Age of Onset    Lung cancer Mother      Liver cancer Father      Drug abuse Sister Gema Woods     Lung cancer Brother          Social History:  Social History     Socioeconomic History    Marital status:    Tobacco Use    Smoking status: Never     Passive exposure: Never    Smokeless tobacco: Never   Substance and Sexual Activity    Alcohol use: Yes     Alcohol/week: 4.0 standard drinks of alcohol     Types: 4 Cans of beer per week     Comment: 1-2 times weekly    Drug use: Not Currently     Types: Marijuana    Sexual activity: Not Currently     Partners: Female     Birth control/protection: Condom     Social Drivers of Health     Financial Resource Strain: Low Risk  (3/14/2024)    Overall Financial Resource Strain (CARDIA)     Difficulty of Paying Living Expenses: Not hard at all   Food Insecurity: No Food Insecurity (3/14/2024)    Hunger Vital Sign     Worried About Running Out of Food in the Last Year: Never true     Ran Out of Food in the Last Year: Never true   Transportation Needs: No Transportation Needs (9/4/2024)    TRANSPORTATION NEEDS     Transportation : No   Physical Activity: Sufficiently Active (3/14/2024)    Exercise Vital Sign     Days of  Exercise per Week: 3 days     Minutes of Exercise per Session: 60 min   Stress: No Stress Concern Present (3/14/2024)    [employer] - Occupational Stress Questionnaire     Feeling of Stress : Not at all   Housing Stability: Low Risk  (3/14/2024)    Housing Stability Vital Sign     Unable to Pay for Housing in the Last Year: No     Number of Places Lived in the Last Year: 1     Unstable Housing in the Last Year: No        Allergies:  Review of patient's allergies indicates:  No Known Allergies     Objective:  Vitals:    11/11/24 1123   BP: 122/70   Pulse: 75   Temp: 97.9 °F (36.6 °C)     General:  Well developed, well nourished adult male in no acute distress  Abdomen: Soft, nontender, no masses  Extremities:  No clubbing, cyanosis, or edema  Neurologic:  Grossly intact  Musculoskeletal:  Normal tone    Assessment:  1. Prostate cancer  - POCT URINE DIPSTICK WITH MICROSCOPE, AUTOMATED  - PSA, Total (Diagnostic); Future    2. Other secondary acute gout of multiple sites  - allopurinoL (ZYLOPRIM) 300 MG tablet; Take 1 tablet (300 mg total) by mouth once daily.  Dispense: 90 tablet; Refill: 0     Plan:  PSA is decreasing.  Repeat the PSA in three months.  I refilled allopurinol for him to last until his next appointment with his primary care.    Follow-up tests needed:   PSA in three months.      Return appointment:  Three months.

## 2024-12-10 ENCOUNTER — OFFICE VISIT (OUTPATIENT)
Dept: FAMILY MEDICINE | Facility: CLINIC | Age: 64
End: 2024-12-10
Payer: MEDICAID

## 2024-12-10 VITALS
BODY MASS INDEX: 36.49 KG/M2 | TEMPERATURE: 98 F | WEIGHT: 246.38 LBS | OXYGEN SATURATION: 98 % | HEART RATE: 85 BPM | DIASTOLIC BLOOD PRESSURE: 77 MMHG | HEIGHT: 69 IN | SYSTOLIC BLOOD PRESSURE: 146 MMHG

## 2024-12-10 DIAGNOSIS — I10 PRIMARY HYPERTENSION: Primary | ICD-10-CM

## 2024-12-10 DIAGNOSIS — Z00.00 PREVENTATIVE HEALTH CARE: ICD-10-CM

## 2024-12-10 DIAGNOSIS — M10.9 GOUT, UNSPECIFIED CAUSE, UNSPECIFIED CHRONICITY, UNSPECIFIED SITE: ICD-10-CM

## 2024-12-10 DIAGNOSIS — B36.0 TINEA VERSICOLOR: ICD-10-CM

## 2024-12-10 DIAGNOSIS — E78.5 HYPERLIPIDEMIA, UNSPECIFIED HYPERLIPIDEMIA TYPE: ICD-10-CM

## 2024-12-10 DIAGNOSIS — M10.49 OTHER SECONDARY ACUTE GOUT OF MULTIPLE SITES: ICD-10-CM

## 2024-12-10 LAB
ALBUMIN SERPL-MCNC: 4.2 G/DL (ref 3.4–4.8)
ALBUMIN/GLOB SERPL: 1.1 RATIO (ref 1.1–2)
ALP SERPL-CCNC: 75 UNIT/L (ref 40–150)
ALT SERPL-CCNC: 47 UNIT/L (ref 0–55)
ANION GAP SERPL CALC-SCNC: 11 MEQ/L
AST SERPL-CCNC: 27 UNIT/L (ref 5–34)
BACTERIA #/AREA URNS AUTO: ABNORMAL /HPF
BILIRUB SERPL-MCNC: 0.3 MG/DL
BILIRUB UR QL STRIP.AUTO: NEGATIVE
BUN SERPL-MCNC: 23.4 MG/DL (ref 8.4–25.7)
CALCIUM SERPL-MCNC: 9.8 MG/DL (ref 8.8–10)
CHLORIDE SERPL-SCNC: 107 MMOL/L (ref 98–107)
CLARITY UR: CLEAR
CO2 SERPL-SCNC: 23 MMOL/L (ref 23–31)
COLOR UR AUTO: ABNORMAL
CREAT SERPL-MCNC: 1.07 MG/DL (ref 0.72–1.25)
CREAT/UREA NIT SERPL: 22
GFR SERPLBLD CREATININE-BSD FMLA CKD-EPI: >60 ML/MIN/1.73/M2
GLOBULIN SER-MCNC: 4 GM/DL (ref 2.4–3.5)
GLUCOSE SERPL-MCNC: 102 MG/DL (ref 82–115)
GLUCOSE UR QL STRIP: NORMAL
HGB UR QL STRIP: NEGATIVE
HYALINE CASTS #/AREA URNS LPF: ABNORMAL /LPF
KETONES UR QL STRIP: NEGATIVE
LEUKOCYTE ESTERASE UR QL STRIP: NEGATIVE
MUCOUS THREADS URNS QL MICRO: ABNORMAL /LPF
NITRITE UR QL STRIP: NEGATIVE
PH UR STRIP: 6 [PH]
POTASSIUM SERPL-SCNC: 3.9 MMOL/L (ref 3.5–5.1)
PROT SERPL-MCNC: 8.2 GM/DL (ref 5.8–7.6)
PROT UR QL STRIP: NEGATIVE
RBC #/AREA URNS AUTO: ABNORMAL /HPF
SODIUM SERPL-SCNC: 141 MMOL/L (ref 136–145)
SP GR UR STRIP.AUTO: 1.02 (ref 1–1.03)
SQUAMOUS #/AREA URNS LPF: ABNORMAL /HPF
URATE SERPL-MCNC: 6.5 MG/DL (ref 3.5–7.2)
UROBILINOGEN UR STRIP-ACNC: NORMAL
WBC #/AREA URNS AUTO: ABNORMAL /HPF

## 2024-12-10 PROCEDURE — 80053 COMPREHEN METABOLIC PANEL: CPT

## 2024-12-10 PROCEDURE — 99213 OFFICE O/P EST LOW 20 MIN: CPT | Mod: PBBFAC

## 2024-12-10 PROCEDURE — 84550 ASSAY OF BLOOD/URIC ACID: CPT

## 2024-12-10 PROCEDURE — 36415 COLL VENOUS BLD VENIPUNCTURE: CPT

## 2024-12-10 PROCEDURE — 90750 HZV VACC RECOMBINANT IM: CPT | Mod: PBBFAC

## 2024-12-10 PROCEDURE — 90471 IMMUNIZATION ADMIN: CPT | Mod: PBBFAC

## 2024-12-10 PROCEDURE — 81001 URINALYSIS AUTO W/SCOPE: CPT

## 2024-12-10 RX ORDER — ATORVASTATIN CALCIUM 40 MG/1
40 TABLET, FILM COATED ORAL DAILY
Qty: 90 TABLET | Refills: 1 | Status: SHIPPED | OUTPATIENT
Start: 2024-12-10 | End: 2025-06-08

## 2024-12-10 RX ORDER — HYDROCHLOROTHIAZIDE 25 MG/1
25 TABLET ORAL DAILY
Qty: 90 TABLET | Refills: 1 | Status: SHIPPED | OUTPATIENT
Start: 2024-12-10 | End: 2025-06-08

## 2024-12-10 RX ORDER — ALLOPURINOL 300 MG/1
300 TABLET ORAL DAILY
Qty: 90 TABLET | Refills: 1 | Status: SHIPPED | OUTPATIENT
Start: 2024-12-10 | End: 2025-06-08

## 2024-12-10 RX ORDER — LISINOPRIL 40 MG/1
40 TABLET ORAL DAILY
Qty: 90 TABLET | Refills: 1 | Status: SHIPPED | OUTPATIENT
Start: 2024-12-10 | End: 2025-06-08

## 2024-12-10 RX ORDER — FLUCONAZOLE 150 MG/1
300 TABLET ORAL WEEKLY
Qty: 4 TABLET | Refills: 0 | Status: SHIPPED | OUTPATIENT
Start: 2024-12-10 | End: 2024-12-24

## 2024-12-10 RX ADMIN — Medication 0.5 ML: at 03:12

## 2024-12-11 NOTE — PROGRESS NOTES
I reviewed History, PE, A/P and medical record.  Services provided in outpatient department of a teaching hospital/facility, I was immediately available.  I agree with resident, care reasonable and necessary with any exceptions stated below.  I evaluated the patient with resident at time of visit, participated in key parts of H/P and management was discussed.    Isolated elevated BP in clinic today  Doing well  UA and labs clear      Anjelica Coelho MD  [facility] - [place]

## 2024-12-19 ENCOUNTER — TELEPHONE (OUTPATIENT)
Dept: FAMILY MEDICINE | Facility: CLINIC | Age: 64
End: 2024-12-19
Payer: MEDICAID

## 2024-12-19 NOTE — TELEPHONE ENCOUNTER
Please call Gastro clinic, Dr Jeancarlos Medrano office, request the colonoscopy report, and pathology, It may have been done somewhere around 2021, hold this message until received, send to me first for review  thanks

## 2025-01-01 PROBLEM — K63.5 COLON POLYPS: Status: ACTIVE | Noted: 2025-01-01

## 2025-01-05 DIAGNOSIS — M10.49 OTHER SECONDARY ACUTE GOUT OF MULTIPLE SITES: ICD-10-CM

## 2025-01-06 RX ORDER — DICLOFENAC SODIUM 75 MG/1
75 TABLET, DELAYED RELEASE ORAL 2 TIMES DAILY
Qty: 60 TABLET | Refills: 1 | Status: SHIPPED | OUTPATIENT
Start: 2025-01-06

## 2025-02-06 DIAGNOSIS — I10 PRIMARY HYPERTENSION: ICD-10-CM

## 2025-02-06 RX ORDER — LISINOPRIL 40 MG/1
40 TABLET ORAL DAILY
Qty: 90 TABLET | Refills: 1 | Status: SHIPPED | OUTPATIENT
Start: 2025-02-06 | End: 2025-08-05

## 2025-02-10 ENCOUNTER — LAB VISIT (OUTPATIENT)
Dept: LAB | Facility: HOSPITAL | Age: 65
End: 2025-02-10
Attending: UROLOGY
Payer: MEDICAID

## 2025-02-10 DIAGNOSIS — C61 PROSTATE CANCER: ICD-10-CM

## 2025-02-10 LAB — PSA SERPL-MCNC: 0.96 NG/ML

## 2025-02-10 PROCEDURE — 84153 ASSAY OF PSA TOTAL: CPT

## 2025-02-10 PROCEDURE — 36415 COLL VENOUS BLD VENIPUNCTURE: CPT

## 2025-02-13 ENCOUNTER — OFFICE VISIT (OUTPATIENT)
Dept: UROLOGY | Facility: CLINIC | Age: 65
End: 2025-02-13
Payer: MEDICAID

## 2025-02-13 VITALS
WEIGHT: 247.19 LBS | HEART RATE: 80 BPM | BODY MASS INDEX: 36.61 KG/M2 | SYSTOLIC BLOOD PRESSURE: 127 MMHG | TEMPERATURE: 98 F | DIASTOLIC BLOOD PRESSURE: 75 MMHG | HEIGHT: 69 IN | OXYGEN SATURATION: 98 % | RESPIRATION RATE: 20 BRPM

## 2025-02-13 DIAGNOSIS — N52.35 ERECTILE DYSFUNCTION FOLLOWING RADIATION THERAPY: ICD-10-CM

## 2025-02-13 DIAGNOSIS — C61 PROSTATE CANCER: Primary | ICD-10-CM

## 2025-02-13 LAB
BILIRUB SERPL-MCNC: NEGATIVE MG/DL
BLOOD URINE, POC: NEGATIVE
COLOR, POC UA: YELLOW
GLUCOSE UR QL STRIP: NEGATIVE
KETONES UR QL STRIP: NEGATIVE
LEUKOCYTE ESTERASE URINE, POC: NEGATIVE
NITRITE, POC UA: NEGATIVE
PH, POC UA: 5.5
PROTEIN, POC: 30
SPECIFIC GRAVITY, POC UA: 1.03
UROBILINOGEN, POC UA: 0.2

## 2025-02-13 PROCEDURE — 4010F ACE/ARB THERAPY RXD/TAKEN: CPT | Mod: CPTII,,, | Performed by: UROLOGY

## 2025-02-13 PROCEDURE — 1160F RVW MEDS BY RX/DR IN RCRD: CPT | Mod: CPTII,,, | Performed by: UROLOGY

## 2025-02-13 PROCEDURE — 99214 OFFICE O/P EST MOD 30 MIN: CPT | Mod: PBBFAC | Performed by: UROLOGY

## 2025-02-13 PROCEDURE — 81001 URINALYSIS AUTO W/SCOPE: CPT | Mod: PBBFAC | Performed by: UROLOGY

## 2025-02-13 PROCEDURE — 1159F MED LIST DOCD IN RCRD: CPT | Mod: CPTII,,, | Performed by: UROLOGY

## 2025-02-13 PROCEDURE — 3074F SYST BP LT 130 MM HG: CPT | Mod: CPTII,,, | Performed by: UROLOGY

## 2025-02-13 PROCEDURE — 99214 OFFICE O/P EST MOD 30 MIN: CPT | Mod: S$PBB,,, | Performed by: UROLOGY

## 2025-02-13 PROCEDURE — 3008F BODY MASS INDEX DOCD: CPT | Mod: CPTII,,, | Performed by: UROLOGY

## 2025-02-13 PROCEDURE — 3078F DIAST BP <80 MM HG: CPT | Mod: CPTII,,, | Performed by: UROLOGY

## 2025-02-13 NOTE — PROGRESS NOTES
CC:  Prostate cancer    HPI:  Eduin Ortiz is a 64 y.o. male seen for follow-up of prostate cancer.  The patient had a PSA of 5.82 on 3 February 2022.  He underwent a prostate biopsy on 31 May 2022.  The biopsy showed [place] grade 3+3 in the right and left apex.  There was a question of some bony lesions so these were biopsied and that was negative for cancer.  He elected to have EBRT which he completed in August 2022.  Following radiation his PSA was 4.2 and remained that for four months so I ordered a PSMA PET scan which was essentially negative for metastatic disease.  PSA has been fairly stable since then.  He has no urinary complaints.  He complains of erectile dysfunction.  He was given tadalfil but he hasn't used it yet.  He is saving it for a back up if needed.      Urinalysis:  Results for orders placed or performed in visit on 02/13/25   POCT URINE DIPSTICK WITH MICROSCOPE, AUTOMATED   Result Value Ref Range    Color, UA Yellow     Spec Grav UA 1.030     pH, UA 5.5     WBC, UA Negative     Nitrite, UA Negative     Protein, POC 30     Glucose, UA Negative     Ketones, UA Negative     Urobilinogen, UA 0.2     Bilirubin, POC Negative     Blood, UA Negative      Microscopic Urinalysis:  WBC:   None per HPF     RBC:    None per HPF     Bacteria:    None per HPF     Squamous epithelial cells:  None per HPF      Crystals:   None    Lab Results:  PSA History:    02/09/21 10:00 02/03/22 11:01 11/28/22 09:21 03/01/23 09:10 04/25/23 07:01 07/26/23 06:35 11/09/23 08:21 02/06/24 08:56 05/14/24 10:18 08/08/24 12:27 11/08/24 13:34 02/10/25 14:34   4.59 (H) 5.82 4.20 (H) 4.26 (H) 2.16 1.61 1.33 1.35 1.51 1.23 0.96 0.96       ROS:  All systems reviewed and are negative except as documented in HPI and/or Assessment and Plan.     Patient Active Problem List:     Patient Active Problem List   Diagnosis    Elevated PSA    Hypertension    HLD (hyperlipidemia)    Tinea versicolor    Prostate cancer    Erectile dysfunction     Colon polyps        Past Medical History:  Past Medical History:   Diagnosis Date    Gout, unspecified     HLD (hyperlipidemia)     Hypertension     Obesity, Class I, BMI 30-34.9     Tinea versicolor         Past Surgical History:  Past Surgical History:   Procedure Laterality Date    APPENDECTOMY      CIRCUMCISION      COLONOSCOPY  02/2021    Cscope DR Medrano  2/2021   two 5-8 mm TA ascending colon, repeat 5 yrs    ENDOSCOPY      INNER EAR SURGERY      KNEE CARTILAGE SURGERY      TONSILLECTOMY      TRANSRECTAL BIOPSY OF PROSTATE WITH ULTRASOUND GUIDANCE N/A 05/31/2022    Procedure: BIOPSY, PROSTATE, RECTAL APPROACH, WITH US GUIDANCE;  Surgeon: Colton Herrera MD;  Location: [facility] ENDOSCOPY;  Service: Urology;  Laterality: N/A;        Family History:  Family History   Problem Relation Name Age of Onset    Lung cancer Mother      Liver cancer Father      Drug abuse Sister Gema Woods     Lung cancer Brother          Social History:  Social History     Socioeconomic History    Marital status:    Tobacco Use    Smoking status: Never     Passive exposure: Never    Smokeless tobacco: Never   Substance and Sexual Activity    Alcohol use: Yes     Alcohol/week: 4.0 standard drinks of alcohol     Types: 4 Cans of beer per week     Comment: 1-2 times weekly    Drug use: Not Currently     Types: Marijuana    Sexual activity: Not Currently     Partners: Female     Birth control/protection: Condom     Social Drivers of Health     Financial Resource Strain: Low Risk  (3/14/2024)    Overall Financial Resource Strain (CARDIA)     Difficulty of Paying Living Expenses: Not hard at all   Food Insecurity: No Food Insecurity (3/14/2024)    Hunger Vital Sign     Worried About Running Out of Food in the Last Year: Never true     Ran Out of Food in the Last Year: Never true   Transportation Needs: No Transportation Needs (9/4/2024)    TRANSPORTATION NEEDS     Transportation : No   Physical Activity: Sufficiently Active (3/14/2024)     Exercise Vital Sign     Days of Exercise per Week: 3 days     Minutes of Exercise per Session: 60 min   Stress: No Stress Concern Present (3/14/2024)    [employer] - Occupational Stress Questionnaire     Feeling of Stress : Not at all   Housing Stability: Low Risk  (3/14/2024)    Housing Stability Vital Sign     Unable to Pay for Housing in the Last Year: No     Number of Places Lived in the Last Year: 1     Unstable Housing in the Last Year: No        Allergies:  Review of patient's allergies indicates:  No Known Allergies     Objective:  Vitals:    02/13/25 1149   BP: 127/75   Pulse: 80   Resp: 20   Temp: 98.1 °F (36.7 °C)     General:  Well developed, well nourished adult male in no acute distress  Abdomen: Soft, nontender, no masses  Extremities:  No clubbing, cyanosis, or edema  Neurologic:  Grossly intact  Musculoskeletal:  Normal tone    Assessment:  1. Prostate cancer  - POCT URINE DIPSTICK WITH MICROSCOPE, AUTOMATED  - PSA, Total (Diagnostic); Future    2. Erectile dysfunction following radiation therapy    Plan:  The PSA continues to decrease.  Will go to PSA every six months.    Continue tadalafil as needed.     Follow-up tests needed:   PSA in six months.      Return appointment:  Six months with PSA.

## 2025-02-13 NOTE — PROGRESS NOTES
pATIENT Seen by Dr. INEZ Burt will return in 6 months with psa. Written and verbal discharge instructions given.

## 2025-04-07 ENCOUNTER — DOCUMENTATION ONLY (OUTPATIENT)
Facility: CLINIC | Age: 65
End: 2025-04-07
Payer: MEDICAID

## 2025-04-21 ENCOUNTER — OFFICE VISIT (OUTPATIENT)
Dept: FAMILY MEDICINE | Facility: CLINIC | Age: 65
End: 2025-04-21
Payer: MEDICARE

## 2025-04-21 VITALS
DIASTOLIC BLOOD PRESSURE: 83 MMHG | HEART RATE: 84 BPM | TEMPERATURE: 98 F | SYSTOLIC BLOOD PRESSURE: 128 MMHG | HEIGHT: 69 IN | OXYGEN SATURATION: 100 % | WEIGHT: 248.63 LBS | BODY MASS INDEX: 36.83 KG/M2 | RESPIRATION RATE: 20 BRPM

## 2025-04-21 DIAGNOSIS — M10.49 OTHER SECONDARY ACUTE GOUT OF MULTIPLE SITES: ICD-10-CM

## 2025-04-21 DIAGNOSIS — I10 PRIMARY HYPERTENSION: ICD-10-CM

## 2025-04-21 DIAGNOSIS — E78.5 HYPERLIPIDEMIA, UNSPECIFIED HYPERLIPIDEMIA TYPE: ICD-10-CM

## 2025-04-21 DIAGNOSIS — Z23 IMMUNIZATION DUE: Primary | ICD-10-CM

## 2025-04-21 PROCEDURE — 99213 OFFICE O/P EST LOW 20 MIN: CPT | Mod: PBBFAC

## 2025-04-21 RX ORDER — ALLOPURINOL 300 MG/1
300 TABLET ORAL DAILY
Qty: 90 TABLET | Refills: 1 | Status: SHIPPED | OUTPATIENT
Start: 2025-04-21 | End: 2025-10-18

## 2025-04-21 RX ORDER — LISINOPRIL 40 MG/1
40 TABLET ORAL DAILY
Qty: 90 TABLET | Refills: 1 | Status: SHIPPED | OUTPATIENT
Start: 2025-04-21 | End: 2025-10-18

## 2025-04-21 RX ORDER — HYDROCHLOROTHIAZIDE 25 MG/1
25 TABLET ORAL DAILY
Qty: 90 TABLET | Refills: 1 | Status: SHIPPED | OUTPATIENT
Start: 2025-04-21 | End: 2025-10-18

## 2025-04-21 RX ORDER — ATORVASTATIN CALCIUM 40 MG/1
40 TABLET, FILM COATED ORAL DAILY
Qty: 90 TABLET | Refills: 1 | Status: SHIPPED | OUTPATIENT
Start: 2025-04-21 | End: 2025-07-20

## 2025-04-21 NOTE — PROGRESS NOTES
"[facility] Note    Subjective:     Patient ID: Eduin Ortiz is a 65 y.o. male    Chief Complaint:   Chief Complaint   Patient presents with    Follow-up     4 Month Follow Up. Patient states he needs a new prescription for Atorvastatin. States he also needs second dose of Shingles vaccine.       HPI  Eduin Ortiz is a 65 y.o. male who presents for follow-up     Htn: lisinopril & hctz. BP well controlled. Never going over 150 systolic. Asymptomatic    HLD: well controlled. Request refills on lipitor. No myalgias.    Tetanus going to [facility] in 2019. Due for shingrix round 2. Declines tetanus and pneumonia vaccines.     Current Outpatient Medications   Medication Instructions    allopurinoL (ZYLOPRIM) 300 mg, Oral, Daily    atorvastatin (LIPITOR) 40 mg, Oral, Daily    colchicine (COLCRYS) 0.6 mg tablet Take 2 tabs by mouth at start of gout flare followed by 1 tab one hour later    diclofenac (VOLTAREN) 75 mg, Oral, 2 times daily    hydroCHLOROthiazide (HYDRODIURIL) 25 mg, Oral, Daily    lisinopriL (PRINIVIL,ZESTRIL) 40 mg, Oral, Daily    tadalafiL (CIALIS) 20 mg, Oral, Daily PRN     Review of Systems  As per HPI    Objective:         12/10/2024     1:08 PM 2/13/2025    11:49 AM 4/21/2025     8:18 AM   Vitals - 1 value per visit   SYSTOLIC 146 127 128   DIASTOLIC 77 75 83   Pulse 85 80 84   Temp 98.4 °F (36.9 °C) 98.1 °F (36.7 °C) 97.9 °F (36.6 °C)   Resp  20 20   SPO2 98 % 98 % 100 %   Weight (lb) 246.4 247.2 248.6   Weight (kg) 111.766 112.129 112.764   Height 5' 9" (1.753 m) 5' 8.9" (1.75 m) 5' 8.9" (1.75 m)   BMI (Calculated) 36.4 36.6 36.8   Pain Score  Zero      Physical Exam  Constitutional:       General: He is not in acute distress.     Appearance: He is not ill-appearing.   Cardiovascular:      Rate and Rhythm: Normal rate and regular rhythm.      Heart sounds: No murmur heard.  Pulmonary:      Effort: No respiratory distress.      Breath sounds: Normal breath sounds. No wheezing.   Abdominal: "      Palpations: Abdomen is soft.   Skin:     General: Skin is warm and dry.      Capillary Refill: Capillary refill takes less than 2 seconds.   Neurological:      Mental Status: Mental status is at baseline.      Motor: No weakness.   Psychiatric:         Mood and Affect: Mood normal.         Assessment & Plan       1. Immunization due  Vaccine given   -     varicella zoster (Shingrix) IM vaccine (>/= 51 yo)    2. Hyperlipidemia, unspecified hyperlipidemia type  Refills provided  -     atorvastatin (LIPITOR) 40 MG tablet; Take 1 tablet (40 mg total) by mouth once daily.  Dispense: 90 tablet; Refill: 1    3. Primary hypertension  BP controlled  Meds refilled  -     lisinopriL (PRINIVIL,ZESTRIL) 40 MG tablet; Take 1 tablet (40 mg total) by mouth once daily.  Dispense: 90 tablet; Refill: 1  -     hydroCHLOROthiazide (HYDRODIURIL) 25 MG tablet; Take 1 tablet (25 mg total) by mouth once daily.  Dispense: 90 tablet; Refill: 1    4. Other secondary acute gout of multiple sites  Meds refilled  -     allopurinoL (ZYLOPRIM) 300 MG tablet; Take 1 tablet (300 mg total) by mouth once daily.  Dispense: 90 tablet; Refill: 1       Health Maintenance   Topic Date Due    TETANUS VACCINE  Never done    Pneumococcal Vaccines (Age 50+) (1 of 2 - PCV) Never done    RSV Vaccine (Age 60+ and Pregnant patients) (1 - Risk 60-74 years 1-dose series) Never done    COVID-19 Vaccine (2 - [place] risk series) 06/16/2021    Influenza Vaccine (1) 09/01/2024    Shingles Vaccine (2 of 2) 02/04/2025    Colorectal Cancer Screening  02/22/2026    Hemoglobin A1c (Diabetic Prevention Screening)  09/04/2027    Lipid Panel  09/04/2029    Hepatitis C Screening  Completed    HIV Screening  Completed     Future Appointments   Date Time Provider Department Center   8/14/2025  8:15 AM Sissy Moran DO [facility] [place]        RTC in 3-4 mo    Prabhu Mcintosh MD  \[facility] [place] [facility]\"" Family Medicine, PGY-2

## 2025-08-12 ENCOUNTER — LAB VISIT (OUTPATIENT)
Dept: LAB | Facility: HOSPITAL | Age: 65
End: 2025-08-12
Attending: UROLOGY
Payer: MEDICARE

## 2025-08-12 DIAGNOSIS — C61 PROSTATE CANCER: ICD-10-CM

## 2025-08-12 LAB — PSA SERPL-MCNC: 1.06 NG/ML

## 2025-08-12 PROCEDURE — 84153 ASSAY OF PSA TOTAL: CPT

## 2025-08-12 PROCEDURE — 36415 COLL VENOUS BLD VENIPUNCTURE: CPT

## 2025-08-14 ENCOUNTER — OFFICE VISIT (OUTPATIENT)
Dept: UROLOGY | Facility: CLINIC | Age: 65
End: 2025-08-14
Payer: MEDICARE

## 2025-08-14 VITALS
BODY MASS INDEX: 36.22 KG/M2 | TEMPERATURE: 98 F | HEIGHT: 68 IN | SYSTOLIC BLOOD PRESSURE: 136 MMHG | WEIGHT: 239 LBS | DIASTOLIC BLOOD PRESSURE: 76 MMHG | OXYGEN SATURATION: 96 % | HEART RATE: 69 BPM

## 2025-08-14 DIAGNOSIS — C61 GLEASON GRADE GROUP 1 ADENOCARCINOMA OF PROSTATE: Primary | ICD-10-CM

## 2025-08-14 DIAGNOSIS — N52.35 ERECTILE DYSFUNCTION FOLLOWING RADIATION THERAPY: ICD-10-CM

## 2025-08-14 PROCEDURE — 99213 OFFICE O/P EST LOW 20 MIN: CPT | Mod: PBBFAC | Performed by: UROLOGY

## 2025-08-14 PROCEDURE — 99214 OFFICE O/P EST MOD 30 MIN: CPT | Mod: S$PBB,,, | Performed by: UROLOGY

## 2025-08-14 PROCEDURE — 81001 URINALYSIS AUTO W/SCOPE: CPT | Mod: PBBFAC | Performed by: UROLOGY

## (undated) DEVICE — Device